# Patient Record
Sex: FEMALE | Race: WHITE | Employment: PART TIME | URBAN - METROPOLITAN AREA
[De-identification: names, ages, dates, MRNs, and addresses within clinical notes are randomized per-mention and may not be internally consistent; named-entity substitution may affect disease eponyms.]

---

## 2017-04-09 RX ORDER — LEVOTHYROXINE SODIUM 88 UG/1
TABLET ORAL
Qty: 90 TABLET | Refills: 0 | Status: SHIPPED | OUTPATIENT
Start: 2017-04-09 | End: 2017-05-11

## 2017-04-27 RX ORDER — LEVOTHYROXINE SODIUM 88 UG/1
TABLET ORAL
Qty: 90 TABLET | Refills: 0 | OUTPATIENT
Start: 2017-04-27

## 2017-05-02 ENCOUNTER — TELEPHONE (OUTPATIENT)
Dept: GASTROENTEROLOGY | Facility: CLINIC | Age: 42
End: 2017-05-02

## 2017-05-02 DIAGNOSIS — R74.8 ABNORMAL LIVER ENZYMES: Primary | ICD-10-CM

## 2017-05-02 NOTE — TELEPHONE ENCOUNTER
Pt contacted, states you had ordered some labs over a year ago that she did not do, so . Please enter any labs you want done--pt is seeing Dr Negrete Ask next week so that way she can look at your orders and make sure no duplicates. Thanks.

## 2017-05-02 NOTE — TELEPHONE ENCOUNTER
I would advise repeat liver panel and TATYANA level. She had done the blood work that I had ordered last year.      Order placed

## 2017-05-11 ENCOUNTER — OFFICE VISIT (OUTPATIENT)
Dept: INTERNAL MEDICINE CLINIC | Facility: CLINIC | Age: 42
End: 2017-05-11

## 2017-05-11 VITALS
SYSTOLIC BLOOD PRESSURE: 116 MMHG | OXYGEN SATURATION: 96 % | WEIGHT: 240.81 LBS | BODY MASS INDEX: 38 KG/M2 | DIASTOLIC BLOOD PRESSURE: 80 MMHG | TEMPERATURE: 98 F | HEART RATE: 72 BPM

## 2017-05-11 DIAGNOSIS — E03.9 HYPOTHYROIDISM, UNSPECIFIED TYPE: Primary | ICD-10-CM

## 2017-05-11 DIAGNOSIS — Z12.31 ENCOUNTER FOR SCREENING MAMMOGRAM FOR BREAST CANCER: ICD-10-CM

## 2017-05-11 DIAGNOSIS — Z00.00 ROUTINE HEALTH MAINTENANCE: ICD-10-CM

## 2017-05-11 DIAGNOSIS — J45.20 MILD INTERMITTENT ASTHMA WITHOUT COMPLICATION: ICD-10-CM

## 2017-05-11 DIAGNOSIS — K76.0 FATTY LIVER DISEASE, NONALCOHOLIC: ICD-10-CM

## 2017-05-11 PROCEDURE — 99396 PREV VISIT EST AGE 40-64: CPT | Performed by: INTERNAL MEDICINE

## 2017-05-11 RX ORDER — LEVOTHYROXINE SODIUM 0.05 MG/1
50 TABLET ORAL DAILY
COMMUNITY
End: 2017-05-11 | Stop reason: DRUGHIGH

## 2017-05-11 RX ORDER — ERGOCALCIFEROL 1.25 MG/1
1 CAPSULE ORAL
COMMUNITY
End: 2017-05-11 | Stop reason: ALTCHOICE

## 2017-05-11 RX ORDER — MEDROXYPROGESTERONE ACETATE 2.5 MG/1
1 TABLET ORAL DAILY
COMMUNITY
End: 2017-05-11

## 2017-05-11 RX ORDER — LEVOTHYROXINE SODIUM 88 UG/1
TABLET ORAL
Qty: 90 TABLET | Refills: 3 | Status: SHIPPED | OUTPATIENT
Start: 2017-05-11 | End: 2018-07-14

## 2017-05-11 NOTE — PROGRESS NOTES
Doris Burgos is a 38year old female. Patient presents with:  Physical      HPI:   Doris Burgos is a 38year old female who presents for a complete physical exam.   Feels well.    has been losing weight thru Dr. Shine Duarte, so pt has now opted t Past Medical History   Diagnosis Date   • Hypothyroid    • Migraines    • Tinnitus    • High cholesterol    • Fatty liver 11/2010     liver bx   • Bronchitis      hospital 7/10   • Bronchospasm      severe; hospital 7/10          Past Surgical History auscultation  CARDIO: RRR, normal S1S2, no gallops or murmurs  GI: soft, NT, ND, NABS, no HSM  EXTREMITIES: no cyanosis, clubbing or edema, +2 DP pulses        ASSESSMENT AND PLAN:     1.  Routine health maintenance  Paps per Dr. Chio Hooker done 1/

## 2017-05-12 ENCOUNTER — LAB ENCOUNTER (OUTPATIENT)
Dept: LAB | Facility: HOSPITAL | Age: 42
End: 2017-05-12
Attending: INTERNAL MEDICINE
Payer: COMMERCIAL

## 2017-05-12 DIAGNOSIS — J45.20 MILD INTERMITTENT ASTHMA WITHOUT COMPLICATION: ICD-10-CM

## 2017-05-12 DIAGNOSIS — Z00.00 ROUTINE HEALTH MAINTENANCE: ICD-10-CM

## 2017-05-12 DIAGNOSIS — R74.8 ABNORMAL LIVER ENZYMES: ICD-10-CM

## 2017-05-12 DIAGNOSIS — E03.9 HYPOTHYROIDISM, UNSPECIFIED TYPE: ICD-10-CM

## 2017-05-12 DIAGNOSIS — K76.0 FATTY LIVER DISEASE, NONALCOHOLIC: ICD-10-CM

## 2017-05-12 PROCEDURE — 85025 COMPLETE CBC W/AUTO DIFF WBC: CPT

## 2017-05-12 PROCEDURE — 80048 BASIC METABOLIC PNL TOTAL CA: CPT

## 2017-05-12 PROCEDURE — 86039 ANTINUCLEAR ANTIBODIES (ANA): CPT

## 2017-05-12 PROCEDURE — 84443 ASSAY THYROID STIM HORMONE: CPT

## 2017-05-12 PROCEDURE — 86038 ANTINUCLEAR ANTIBODIES: CPT

## 2017-05-12 PROCEDURE — 80061 LIPID PANEL: CPT

## 2017-05-12 PROCEDURE — 36415 COLL VENOUS BLD VENIPUNCTURE: CPT

## 2017-05-12 PROCEDURE — 82306 VITAMIN D 25 HYDROXY: CPT

## 2017-05-12 PROCEDURE — 80076 HEPATIC FUNCTION PANEL: CPT

## 2017-05-19 NOTE — TELEPHONE ENCOUNTER
----- Message from Dmitriy Barbosa MD sent at 5/16/2017 10:04 PM CDT -----  LFT still elevated, TATYANA positive - I am still concerned about autoimmune liver disease and RN to have pt see me in office in follow, it has been over 1 year since I have seen her.

## 2017-05-19 NOTE — TELEPHONE ENCOUNTER
Spoke with pt and reviewed below results    She already has follow-up scheduled for 6/20 to discuss results.

## 2017-05-31 ENCOUNTER — TELEPHONE (OUTPATIENT)
Dept: INTERNAL MEDICINE CLINIC | Facility: CLINIC | Age: 42
End: 2017-05-31

## 2017-05-31 NOTE — TELEPHONE ENCOUNTER
Pt notified liver enzymes are still elevated  To f/u with DR Andrés Kc  Pt states has appt for mid June  Vit D level low  rec 4000units / day   Added to med list   TATYANA titer high rec rheumatologist to see pt   - states has seen someone in the past will call fo

## 2017-05-31 NOTE — TELEPHONE ENCOUNTER
Please call pt with labs --  Liver enzymes still slightly elevated -- follow up with Dr. Apple Narayanan.   Vit D level low (20) -- start Vit D 4000 units/day  TATYANA level is high with high titers -- indicative of her undifferentiated connective tissue disease -- recom

## 2017-06-03 ENCOUNTER — HOSPITAL ENCOUNTER (OUTPATIENT)
Dept: MAMMOGRAPHY | Facility: HOSPITAL | Age: 42
Discharge: HOME | End: 2017-06-03
Attending: INTERNAL MEDICINE
Payer: COMMERCIAL

## 2017-06-03 DIAGNOSIS — Z12.31 ENCOUNTER FOR SCREENING MAMMOGRAM FOR BREAST CANCER: ICD-10-CM

## 2017-06-03 PROCEDURE — 77067 SCR MAMMO BI INCL CAD: CPT | Performed by: INTERNAL MEDICINE

## 2017-06-20 ENCOUNTER — OFFICE VISIT (OUTPATIENT)
Dept: GASTROENTEROLOGY | Facility: CLINIC | Age: 42
End: 2017-06-20

## 2017-06-20 VITALS
HEART RATE: 57 BPM | HEIGHT: 67 IN | BODY MASS INDEX: 37.73 KG/M2 | WEIGHT: 240.38 LBS | DIASTOLIC BLOOD PRESSURE: 77 MMHG | SYSTOLIC BLOOD PRESSURE: 112 MMHG

## 2017-06-20 DIAGNOSIS — R74.8 ABNORMAL LIVER ENZYMES: Primary | ICD-10-CM

## 2017-06-20 PROCEDURE — 99213 OFFICE O/P EST LOW 20 MIN: CPT | Performed by: INTERNAL MEDICINE

## 2017-06-20 PROCEDURE — 99212 OFFICE O/P EST SF 10 MIN: CPT | Performed by: INTERNAL MEDICINE

## 2017-06-20 RX ORDER — CLOBETASOL PROPIONATE 0.5 MG/G
OINTMENT TOPICAL
Refills: 0 | COMMUNITY
Start: 2017-06-02 | End: 2018-09-11

## 2017-06-20 NOTE — PATIENT INSTRUCTIONS
Fatty liver vs. Autoimmune liver disease  - forward info to [facility]  - continue to work on weight loss- agree with you seeing Dr. Enrique Frank  - avoid alcohol x 3 months  - repeat lab test /liver panel in September

## 2017-06-23 NOTE — PROGRESS NOTES
Cecile Del Cid is a 38year old female. HPI:   Patient presents with: Follow - Up: No current complaints       The patient is a 67-year-old female with a history of fatty liver disease and ongoing abnormal liver function tests.   She was seen at [facility] visit):    Current Outpatient Prescriptions:  Clobetasol Propionate 0.05 % External Ointment  Disp:  Rfl: 0   Multiple Vitamins-Minerals (MULTIVITAMIN OR) Take 1 tablet by mouth daily.  Disp:  Rfl:    Albuterol Sulfate HFA (PROAIR HFA) 108 (90 BASE) MCG/ACT hepatitis has been discussed with the patient. I discussed sometimes difficult to differentiate these 2 conditions. I reiterated the fact the patient should stop all alcohol consumption.   Additionally the patient will continue to work on healthy lifestyl

## 2017-06-28 ENCOUNTER — APPOINTMENT (OUTPATIENT)
Dept: LAB | Facility: HOSPITAL | Age: 42
End: 2017-06-28
Attending: INTERNAL MEDICINE
Payer: COMMERCIAL

## 2017-06-28 ENCOUNTER — OFFICE VISIT (OUTPATIENT)
Dept: SURGERY | Facility: CLINIC | Age: 42
End: 2017-06-28

## 2017-06-28 VITALS
HEIGHT: 66 IN | OXYGEN SATURATION: 98 % | DIASTOLIC BLOOD PRESSURE: 74 MMHG | RESPIRATION RATE: 18 BRPM | BODY MASS INDEX: 37.95 KG/M2 | SYSTOLIC BLOOD PRESSURE: 118 MMHG | WEIGHT: 236.13 LBS

## 2017-06-28 DIAGNOSIS — E66.9 OBESITY (BMI 30-39.9): ICD-10-CM

## 2017-06-28 DIAGNOSIS — E55.9 VITAMIN D DEFICIENCY: ICD-10-CM

## 2017-06-28 DIAGNOSIS — F43.9 STRESS: ICD-10-CM

## 2017-06-28 DIAGNOSIS — K76.0 FATTY LIVER DISEASE, NONALCOHOLIC: ICD-10-CM

## 2017-06-28 DIAGNOSIS — K76.0 FATTY LIVER DISEASE, NONALCOHOLIC: Primary | ICD-10-CM

## 2017-06-28 PROCEDURE — 93010 ELECTROCARDIOGRAM REPORT: CPT | Performed by: INTERNAL MEDICINE

## 2017-06-28 PROCEDURE — 99204 OFFICE O/P NEW MOD 45 MIN: CPT | Performed by: INTERNAL MEDICINE

## 2017-06-28 PROCEDURE — 93005 ELECTROCARDIOGRAM TRACING: CPT

## 2017-06-28 NOTE — PROGRESS NOTES
[facility] Consultation Note       Date of Consult:  2017    Patient:  Kendra Spicer  :      3/25/1975  MRN:      [id number]    Referring Provider: Dr. Wyatt Gill       Chief Complaint:  Patient presents with:  Consult  Weight times daily as needed.  Disp: 360 mL Rfl: 6   predniSONE 20 MG Oral Tab Take 3 tablets daily for 2 days, then 2 tablets daily for 3 days, then 1 tablet daily for 3 days Disp: 15 tablet Rfl: 5   ipratropium-albuterol (DUONEB) 0.5-2.5 (3) MG/3ML Inhalation So cream, cake Meat, veggie, starch   +large portion sizes  2 glasses of wine at night    Soda Drinker?: No  If yes, how much?:  Diet soda    Number of restaurant or fast food meals/week:  2 meals/week    Nutritional Goals Reviewed and Discussed:     Limit ca Patient desires to pursue traditional weight loss at this time. Fatty liver: should improve with weight loss. Vit d def: should continue supplements    Stress: currently in between homes    Goals for next month:  1. Keep a food log.   2. Drink 48-64

## 2017-07-18 ENCOUNTER — OFFICE VISIT (OUTPATIENT)
Dept: INTERNAL MEDICINE CLINIC | Facility: CLINIC | Age: 42
End: 2017-07-18

## 2017-07-18 VITALS
BODY MASS INDEX: 37.93 KG/M2 | RESPIRATION RATE: 16 BRPM | WEIGHT: 236 LBS | HEIGHT: 66 IN | HEART RATE: 80 BPM | TEMPERATURE: 98 F | DIASTOLIC BLOOD PRESSURE: 72 MMHG | SYSTOLIC BLOOD PRESSURE: 108 MMHG | OXYGEN SATURATION: 98 %

## 2017-07-18 DIAGNOSIS — J02.9 PHARYNGITIS, UNSPECIFIED ETIOLOGY: Primary | ICD-10-CM

## 2017-07-18 PROCEDURE — 99212 OFFICE O/P EST SF 10 MIN: CPT | Performed by: INTERNAL MEDICINE

## 2017-07-18 PROCEDURE — 99213 OFFICE O/P EST LOW 20 MIN: CPT | Performed by: INTERNAL MEDICINE

## 2017-07-18 RX ORDER — ACETAMINOPHEN AND CODEINE PHOSPHATE 300; 30 MG/1; MG/1
1 TABLET ORAL EVERY 4 HOURS PRN
Qty: 20 TABLET | Refills: 0 | Status: SHIPPED | OUTPATIENT
Start: 2017-07-18 | End: 2018-09-11

## 2017-07-18 NOTE — PROGRESS NOTES
Iggy Leyva is a 38year old female. Patient presents with:  Sore Throat: Last Monday patient began experiencing productive cough with yellow phlegm. Cough has started to resolve, however the pt continues to experience sore throat.  Described painful s Past Medical History:   Diagnosis Date   • Bronchitis     hospital 7/10   • Bronchospasm     severe; hospital 7/10   • Fatty liver 11/2010    liver bx   • High cholesterol    • Hypothyroid    • Migraines    • Tinnitus       Social History:  Smoking statu

## 2018-07-15 RX ORDER — LEVOTHYROXINE SODIUM 88 UG/1
TABLET ORAL
Qty: 90 TABLET | Refills: 0 | Status: SHIPPED | OUTPATIENT
Start: 2018-07-15 | End: 2018-09-11

## 2018-09-11 ENCOUNTER — OFFICE VISIT (OUTPATIENT)
Dept: INTERNAL MEDICINE CLINIC | Facility: CLINIC | Age: 43
End: 2018-09-11
Payer: COMMERCIAL

## 2018-09-11 ENCOUNTER — LAB ENCOUNTER (OUTPATIENT)
Dept: LAB | Age: 43
End: 2018-09-11
Attending: INTERNAL MEDICINE
Payer: COMMERCIAL

## 2018-09-11 VITALS
BODY MASS INDEX: 37.8 KG/M2 | DIASTOLIC BLOOD PRESSURE: 80 MMHG | SYSTOLIC BLOOD PRESSURE: 102 MMHG | WEIGHT: 235.19 LBS | HEIGHT: 66 IN | OXYGEN SATURATION: 95 % | TEMPERATURE: 98 F | HEART RATE: 79 BPM

## 2018-09-11 DIAGNOSIS — R53.83 OTHER FATIGUE: ICD-10-CM

## 2018-09-11 DIAGNOSIS — K76.0 FATTY LIVER DISEASE, NONALCOHOLIC: ICD-10-CM

## 2018-09-11 DIAGNOSIS — Z00.00 ROUTINE HEALTH MAINTENANCE: ICD-10-CM

## 2018-09-11 DIAGNOSIS — Z12.31 ENCOUNTER FOR SCREENING MAMMOGRAM FOR BREAST CANCER: Primary | ICD-10-CM

## 2018-09-11 DIAGNOSIS — R77.1 ELEVATED SERUM GLOBULIN LEVEL: ICD-10-CM

## 2018-09-11 DIAGNOSIS — E55.9 VITAMIN D DEFICIENCY: ICD-10-CM

## 2018-09-11 DIAGNOSIS — J45.20 MILD INTERMITTENT ASTHMA WITHOUT COMPLICATION: ICD-10-CM

## 2018-09-11 DIAGNOSIS — E03.9 HYPOTHYROIDISM, UNSPECIFIED TYPE: ICD-10-CM

## 2018-09-11 PROBLEM — F43.9 STRESS: Status: RESOLVED | Noted: 2017-06-28 | Resolved: 2018-09-11

## 2018-09-11 LAB
ALBUMIN SERPL BCP-MCNC: 4.1 G/DL (ref 3.5–4.8)
ALBUMIN/GLOB SERPL: 1 {RATIO} (ref 1–2)
ALP SERPL-CCNC: 78 U/L (ref 32–100)
ALT SERPL-CCNC: 101 U/L (ref 14–54)
ANION GAP SERPL CALC-SCNC: 10 MMOL/L (ref 0–18)
AST SERPL-CCNC: 83 U/L (ref 15–41)
BASOPHILS # BLD: 0.1 K/UL (ref 0–0.2)
BASOPHILS NFR BLD: 1 %
BILIRUB SERPL-MCNC: 1 MG/DL (ref 0.3–1.2)
BUN SERPL-MCNC: 15 MG/DL (ref 8–20)
BUN/CREAT SERPL: 16 (ref 10–20)
CALCIUM SERPL-MCNC: 9.2 MG/DL (ref 8.5–10.5)
CHLORIDE SERPL-SCNC: 97 MMOL/L (ref 95–110)
CHOLEST SERPL-MCNC: 206 MG/DL (ref 110–200)
CO2 SERPL-SCNC: 28 MMOL/L (ref 22–32)
CREAT SERPL-MCNC: 0.94 MG/DL (ref 0.5–1.5)
EOSINOPHIL # BLD: 0.1 K/UL (ref 0–0.7)
EOSINOPHIL NFR BLD: 2 %
ERYTHROCYTE [DISTWIDTH] IN BLOOD BY AUTOMATED COUNT: 13.3 % (ref 11–15)
GLOBULIN PLAS-MCNC: 4.2 G/DL (ref 2.5–3.7)
GLUCOSE SERPL-MCNC: 82 MG/DL (ref 70–99)
HCT VFR BLD AUTO: 45.5 % (ref 35–48)
HDLC SERPL-MCNC: 50 MG/DL
HGB BLD-MCNC: 15.5 G/DL (ref 12–16)
LDLC SERPL CALC-MCNC: 134 MG/DL (ref 0–99)
LYMPHOCYTES # BLD: 2.1 K/UL (ref 1–4)
LYMPHOCYTES NFR BLD: 31 %
MCH RBC QN AUTO: 32.1 PG (ref 27–32)
MCHC RBC AUTO-ENTMCNC: 34.1 G/DL (ref 32–37)
MCV RBC AUTO: 94.1 FL (ref 80–100)
MONOCYTES # BLD: 0.6 K/UL (ref 0–1)
MONOCYTES NFR BLD: 8 %
NEUTROPHILS # BLD AUTO: 4 K/UL (ref 1.8–7.7)
NEUTROPHILS NFR BLD: 58 %
NONHDLC SERPL-MCNC: 156 MG/DL
OSMOLALITY UR CALC.SUM OF ELEC: 280 MOSM/KG (ref 275–295)
PATIENT FASTING: NO
PLATELET # BLD AUTO: 229 K/UL (ref 140–400)
PMV BLD AUTO: 10.8 FL (ref 7.4–10.3)
POTASSIUM SERPL-SCNC: 3.6 MMOL/L (ref 3.3–5.1)
PROT SERPL-MCNC: 8.3 G/DL (ref 5.9–8.4)
RBC # BLD AUTO: 4.83 M/UL (ref 3.7–5.4)
SODIUM SERPL-SCNC: 135 MMOL/L (ref 136–144)
TRIGL SERPL-MCNC: 111 MG/DL (ref 1–149)
TSH SERPL-ACNC: 2.26 UIU/ML (ref 0.45–5.33)
WBC # BLD AUTO: 6.9 K/UL (ref 4–11)

## 2018-09-11 PROCEDURE — 99396 PREV VISIT EST AGE 40-64: CPT | Performed by: INTERNAL MEDICINE

## 2018-09-11 PROCEDURE — 80061 LIPID PANEL: CPT

## 2018-09-11 PROCEDURE — 84443 ASSAY THYROID STIM HORMONE: CPT | Performed by: INTERNAL MEDICINE

## 2018-09-11 PROCEDURE — 85025 COMPLETE CBC W/AUTO DIFF WBC: CPT

## 2018-09-11 PROCEDURE — 86334 IMMUNOFIX E-PHORESIS SERUM: CPT | Performed by: INTERNAL MEDICINE

## 2018-09-11 PROCEDURE — 36415 COLL VENOUS BLD VENIPUNCTURE: CPT | Performed by: INTERNAL MEDICINE

## 2018-09-11 PROCEDURE — 80053 COMPREHEN METABOLIC PANEL: CPT

## 2018-09-11 PROCEDURE — 84165 PROTEIN E-PHORESIS SERUM: CPT | Performed by: INTERNAL MEDICINE

## 2018-09-11 PROCEDURE — 82306 VITAMIN D 25 HYDROXY: CPT

## 2018-09-11 RX ORDER — LEVOTHYROXINE SODIUM 88 UG/1
88 TABLET ORAL
Qty: 90 TABLET | Refills: 3 | Status: SHIPPED | OUTPATIENT
Start: 2018-09-11 | End: 2019-10-08

## 2018-09-11 RX ORDER — PREDNISONE 20 MG/1
TABLET ORAL
Qty: 15 TABLET | Refills: 5 | Status: SHIPPED | OUTPATIENT
Start: 2018-09-11 | End: 2019-09-10 | Stop reason: ALTCHOICE

## 2018-09-11 NOTE — PROGRESS NOTES
Kian Andrade is a 37year old female. Patient presents with:  Physical      HPI:   Kian Andrade is a 37year old female who presents for a complete physical exam.   Feels well. Lots of stress this past year, but things settling down now.   Dakota Lu Mother    • Cancer Maternal Grandmother         brain   • Hypertension Paternal Grandmother    • Cancer Paternal Grandmother         Breast- Age Related   • Breast Cancer Paternal Grandmother 80   • Heart Attack Maternal Grandfather    • Heart Disease Mate Levothyroxine 88mcg/day.      3. Asthma  Stable; occurs mainly in setting of URI.  Has not required any inhalers since 2/2018 when she was sick. 4. Fatty liver disease, nonalcoholic  History of fatty liver diagnosed via liver biopsy in November 2010.  Karena Lincoln

## 2018-09-12 ENCOUNTER — TELEPHONE (OUTPATIENT)
Dept: INTERNAL MEDICINE CLINIC | Facility: CLINIC | Age: 43
End: 2018-09-12

## 2018-09-12 DIAGNOSIS — R77.1 ELEVATED SERUM GLOBULIN LEVEL: Primary | ICD-10-CM

## 2018-09-12 LAB — 25(OH)D3 SERPL-MCNC: 30.9 NG/ML

## 2018-09-12 NOTE — TELEPHONE ENCOUNTER
Called and relayed MDs message on pts vm (hipaa verified). Instructed to call back with any questions.

## 2018-09-12 NOTE — TELEPHONE ENCOUNTER
Please call with lab results. LDL still slightly elevated but improved. Liver enzymes are higher than last year. Please see Dr. Wendy Moreau as discussed.   Her globulin (antibody) level is slightly high -- I have added a couple of additional labs to her blood

## 2018-09-14 LAB
ALBUMIN SERPL ELPH-MCNC: 4.32 G/DL (ref 3.75–5.21)
ALBUMIN/GLOB SERPL: 1.17 {RATIO} (ref 1–2)
ALPHA1 GLOB SERPL ELPH-MCNC: 0.28 G/DL (ref 0.19–0.46)
ALPHA2 GLOB SERPL ELPH-MCNC: 0.72 G/DL (ref 0.48–1.05)
B-GLOBULIN SERPL ELPH-MCNC: 0.94 G/DL (ref 0.68–1.23)
GAMMA GLOB SERPL ELPH-MCNC: 1.74 G/DL (ref 0.62–1.7)
TOTAL PROTEIN (SPECIAL TESTING): 8 G/DL (ref 6.5–9.1)

## 2018-09-29 ENCOUNTER — HOSPITAL ENCOUNTER (OUTPATIENT)
Dept: MAMMOGRAPHY | Facility: HOSPITAL | Age: 43
Discharge: HOME | End: 2018-09-29
Attending: INTERNAL MEDICINE
Payer: COMMERCIAL

## 2018-09-29 DIAGNOSIS — Z12.31 ENCOUNTER FOR SCREENING MAMMOGRAM FOR BREAST CANCER: ICD-10-CM

## 2018-09-29 PROCEDURE — 77067 SCR MAMMO BI INCL CAD: CPT | Performed by: INTERNAL MEDICINE

## 2018-10-24 ENCOUNTER — OFFICE VISIT (OUTPATIENT)
Dept: GASTROENTEROLOGY | Facility: CLINIC | Age: 43
End: 2018-10-24
Payer: COMMERCIAL

## 2018-10-24 VITALS
WEIGHT: 239 LBS | HEIGHT: 66 IN | BODY MASS INDEX: 38.41 KG/M2 | SYSTOLIC BLOOD PRESSURE: 121 MMHG | HEART RATE: 69 BPM | DIASTOLIC BLOOD PRESSURE: 78 MMHG

## 2018-10-24 DIAGNOSIS — R74.8 ABNORMAL LIVER ENZYMES: Primary | ICD-10-CM

## 2018-10-24 PROCEDURE — 99212 OFFICE O/P EST SF 10 MIN: CPT | Performed by: INTERNAL MEDICINE

## 2018-10-24 PROCEDURE — 99213 OFFICE O/P EST LOW 20 MIN: CPT | Performed by: INTERNAL MEDICINE

## 2018-10-24 NOTE — PATIENT INSTRUCTIONS
Fatty liver / abnormal liver function  - work on healthy eating and exercise  - limit alcohol  - repeat liver panel in 3 months  - agree with input from rheumatologist, we could have you seen by academic hepatologist again - pending lab work

## 2018-10-24 NOTE — PROGRESS NOTES
Omar Garcia is a 37year old female. HPI:   Patient presents with: Follow - Up    The patient is a 68-year-old female with a history of abnormal liver function tests/fatty liver.   She follows up in the office today and has really no interval symp tobacco: Never Used    Alcohol use:  Yes      Alcohol/week: 2.0 oz      Types: 4 Shots of liquor per week      Comment: Socially    Drug use: No       Medications (Active prior to today's visit):    Current Outpatient Medications:  predniSONE 20 MG Oral Tab to follow back up. I again reiterated our strategy here, she will work on weight loss, cutting out her alcohol use and exercise and healthy eating. I would advise repeating liver panel in 3 months time.   Considerations for follow back up in an academic

## 2019-02-25 ENCOUNTER — TELEPHONE (OUTPATIENT)
Dept: GASTROENTEROLOGY | Facility: CLINIC | Age: 44
End: 2019-02-25

## 2019-09-10 ENCOUNTER — TELEPHONE (OUTPATIENT)
Dept: INTERNAL MEDICINE CLINIC | Facility: CLINIC | Age: 44
End: 2019-09-10

## 2019-09-10 DIAGNOSIS — Z00.00 ROUTINE HEALTH MAINTENANCE: ICD-10-CM

## 2019-09-10 DIAGNOSIS — K76.0 FATTY LIVER DISEASE, NONALCOHOLIC: ICD-10-CM

## 2019-09-10 DIAGNOSIS — E55.9 VITAMIN D DEFICIENCY: ICD-10-CM

## 2019-09-10 DIAGNOSIS — R53.83 OTHER FATIGUE: ICD-10-CM

## 2019-09-10 DIAGNOSIS — Z12.31 ENCOUNTER FOR SCREENING MAMMOGRAM FOR BREAST CANCER: Primary | ICD-10-CM

## 2019-09-10 DIAGNOSIS — J45.20 MILD INTERMITTENT ASTHMA WITHOUT COMPLICATION: ICD-10-CM

## 2019-09-10 DIAGNOSIS — E03.9 HYPOTHYROIDISM, UNSPECIFIED TYPE: ICD-10-CM

## 2019-09-10 RX ORDER — PREDNISONE 20 MG/1
TABLET ORAL
Qty: 15 TABLET | Refills: 5 | COMMUNITY
Start: 2019-09-10 | End: 2020-08-18

## 2019-09-10 NOTE — TELEPHONE ENCOUNTER
Mammogram from 09/2018 shows breasts category B. Order entered for regular mammogram per protocol. To Dr. Beverley Farley to advise on labs, pending per protocol. Thanks!

## 2019-09-25 ENCOUNTER — OFFICE VISIT (OUTPATIENT)
Dept: RHEUMATOLOGY | Facility: CLINIC | Age: 44
End: 2019-09-25
Payer: COMMERCIAL

## 2019-09-25 VITALS
HEIGHT: 66 IN | HEART RATE: 62 BPM | SYSTOLIC BLOOD PRESSURE: 110 MMHG | BODY MASS INDEX: 36.32 KG/M2 | DIASTOLIC BLOOD PRESSURE: 72 MMHG | WEIGHT: 226 LBS | RESPIRATION RATE: 16 BRPM

## 2019-09-25 DIAGNOSIS — R76.8 POSITIVE ANA (ANTINUCLEAR ANTIBODY): Primary | ICD-10-CM

## 2019-09-25 DIAGNOSIS — K76.0 FATTY LIVER: ICD-10-CM

## 2019-09-25 PROCEDURE — 99244 OFF/OP CNSLTJ NEW/EST MOD 40: CPT | Performed by: INTERNAL MEDICINE

## 2019-09-25 NOTE — PATIENT INSTRUCTIONS
You were seen today for +TATYANA  Your blood work for lupus is negative  Things to look out of rashes, ulcers in mouth, joint swelling  For now get blood work done

## 2019-09-25 NOTE — PROGRESS NOTES
Dilip Celaya is a 53year old female who presents for Patient presents with:  Consult  .    HPI:   CC: +TATYANA, UCTD  Consult: referred by PCP Dr. Vlad Correa  Previously rheumatologist Dr. Mario Shane    This is a 54 yo F with hx of NAFLD (follows with tablets daily for 2 days, then 2 tablets daily for 3 days, then 1 tablet daily for 3 days Disp: 15 tablet Rfl: 5      Past Medical History:   Diagnosis Date   • Bronchitis     hospital 7/10   • Bronchospasm     severe; hospital 7/10   • Fatty liver 11/2010 OCP,   Neuro: No numbness or tingling, no headache, no hx of seizures,   Psych: no hx of anxiety or depression  ENDO: no hx of thyroid disease, no hx of DM  Joint/Muscluskeltal: see HPI,   All other ROS are negative.      EXAM:   /72 (BP Location: [facility] 3/7/2014          11:49 AM 11:49 AM   ANTICARDIOLIPIN AB,IGG,QN      0 - 14 GPL U/mL  <9   ANTICARDIOLIPIN AB,IGM,QN      0 - 12 MPL U/mL  23 (H)   ANTICARDIOLIPIN AB, IGA, QN      0 - 11 APL U/mL  <9   Beta-2 Glycoprotein I, IgG Antibody      0 - 20 GPI I Neutrophils Absolute      1.8 - 7.7 K/UL 4.0    Lymphocytes Absolute      1.0 - 4.0 K/UL 2.1    Monocytes Absolute      0.0 - 1.0 K/UL 0.6    Eosinophils Absolute      0.0 - 0.7 K/UL 0.1    Basophils Absolute      0.0 - 0.2 K/UL 0.1    Bilirubin, Direct

## 2019-10-01 ENCOUNTER — HOSPITAL ENCOUNTER (OUTPATIENT)
Dept: MAMMOGRAPHY | Facility: HOSPITAL | Age: 44
Discharge: HOME | End: 2019-10-01
Attending: INTERNAL MEDICINE
Payer: COMMERCIAL

## 2019-10-01 DIAGNOSIS — Z12.31 ENCOUNTER FOR SCREENING MAMMOGRAM FOR BREAST CANCER: ICD-10-CM

## 2019-10-01 PROCEDURE — 77063 BREAST TOMOSYNTHESIS BI: CPT | Performed by: INTERNAL MEDICINE

## 2019-10-01 PROCEDURE — 77067 SCR MAMMO BI INCL CAD: CPT | Performed by: INTERNAL MEDICINE

## 2019-10-04 ENCOUNTER — LAB ENCOUNTER (OUTPATIENT)
Dept: LAB | Facility: HOSPITAL | Age: 44
End: 2019-10-04
Attending: INTERNAL MEDICINE
Payer: COMMERCIAL

## 2019-10-04 DIAGNOSIS — K76.0 FATTY LIVER DISEASE, NONALCOHOLIC: ICD-10-CM

## 2019-10-04 DIAGNOSIS — R74.8 ABNORMAL LIVER ENZYMES: ICD-10-CM

## 2019-10-04 DIAGNOSIS — E03.9 HYPOTHYROIDISM, UNSPECIFIED TYPE: ICD-10-CM

## 2019-10-04 DIAGNOSIS — R53.83 OTHER FATIGUE: ICD-10-CM

## 2019-10-04 PROCEDURE — 82248 BILIRUBIN DIRECT: CPT

## 2019-10-04 PROCEDURE — 80053 COMPREHEN METABOLIC PANEL: CPT

## 2019-10-04 PROCEDURE — 85025 COMPLETE CBC W/AUTO DIFF WBC: CPT

## 2019-10-04 PROCEDURE — 36415 COLL VENOUS BLD VENIPUNCTURE: CPT

## 2019-10-04 PROCEDURE — 80061 LIPID PANEL: CPT

## 2019-10-04 PROCEDURE — 84443 ASSAY THYROID STIM HORMONE: CPT

## 2019-10-08 ENCOUNTER — TELEPHONE (OUTPATIENT)
Dept: GASTROENTEROLOGY | Facility: CLINIC | Age: 44
End: 2019-10-08

## 2019-10-08 ENCOUNTER — OFFICE VISIT (OUTPATIENT)
Dept: INTERNAL MEDICINE CLINIC | Facility: CLINIC | Age: 44
End: 2019-10-08
Payer: COMMERCIAL

## 2019-10-08 VITALS
WEIGHT: 225 LBS | TEMPERATURE: 98 F | RESPIRATION RATE: 16 BRPM | HEART RATE: 84 BPM | BODY MASS INDEX: 36.16 KG/M2 | SYSTOLIC BLOOD PRESSURE: 108 MMHG | DIASTOLIC BLOOD PRESSURE: 70 MMHG | HEIGHT: 66 IN | OXYGEN SATURATION: 98 %

## 2019-10-08 DIAGNOSIS — Z00.00 ROUTINE HEALTH MAINTENANCE: ICD-10-CM

## 2019-10-08 DIAGNOSIS — R74.8 ABNORMAL LIVER ENZYMES: Primary | ICD-10-CM

## 2019-10-08 DIAGNOSIS — E03.9 HYPOTHYROIDISM, UNSPECIFIED TYPE: ICD-10-CM

## 2019-10-08 DIAGNOSIS — K76.0 FATTY LIVER DISEASE, NONALCOHOLIC: ICD-10-CM

## 2019-10-08 DIAGNOSIS — J45.20 MILD INTERMITTENT ASTHMA WITHOUT COMPLICATION: Primary | ICD-10-CM

## 2019-10-08 DIAGNOSIS — K76.0 NAFLD (NONALCOHOLIC FATTY LIVER DISEASE): ICD-10-CM

## 2019-10-08 PROCEDURE — 90686 IIV4 VACC NO PRSV 0.5 ML IM: CPT | Performed by: INTERNAL MEDICINE

## 2019-10-08 PROCEDURE — 99396 PREV VISIT EST AGE 40-64: CPT | Performed by: INTERNAL MEDICINE

## 2019-10-08 PROCEDURE — 90472 IMMUNIZATION ADMIN EACH ADD: CPT | Performed by: INTERNAL MEDICINE

## 2019-10-08 PROCEDURE — 90715 TDAP VACCINE 7 YRS/> IM: CPT | Performed by: INTERNAL MEDICINE

## 2019-10-08 PROCEDURE — 90471 IMMUNIZATION ADMIN: CPT | Performed by: INTERNAL MEDICINE

## 2019-10-08 RX ORDER — LEVOTHYROXINE SODIUM 88 UG/1
88 TABLET ORAL
Qty: 90 TABLET | Refills: 3 | Status: SHIPPED | OUTPATIENT
Start: 2019-10-08 | End: 2020-10-01

## 2019-10-08 NOTE — PROGRESS NOTES
Mello Peguero is a 53year old female. Patient presents with:  Physical: Annual Px      HPI:   Mello Peguero is a 53year old female who presents for a complete physical exam.   Feels well. States she has done [employer] all of her life. Father    • Obesity Father    • Pancreatic Cancer Father 76   • Diabetes Mother    • Hypertension Mother    • Obesity Mother    • Cancer Maternal Grandmother         brain   • Hypertension Paternal Grandmother    • Cancer Paternal Grandmother         Phillip West that she had lichen ?sclerosis; was given a cream which she did not like. Rec follow up with gyne Dr. Wilfredo Jett Mammo normal 10/1/19. Lipids, FBS normal.  Tdap today 10/8/19. Cont exercise. Had flu vaccine today.     Oligomenorrhea  Has had irregular menses population with a +TATYANA with no clinical significance. Was told to follow up in 6 months. RTC 1 yr.       Mirna Hall, 10/08/19, 11:14 AM

## 2019-10-08 NOTE — TELEPHONE ENCOUNTER
Pt is scheduled on 11/21/2019 215 pm and would like to know if  can put orders in the system for labs.  States her PCP mentioned a liver biopsy and that's the reason for follow up visit and would like to know if  agrees for on going issue please call th

## 2019-10-08 NOTE — TELEPHONE ENCOUNTER
Dr. Rossi Childs,     Pt saw PCP and talked about following up with you regarding elevated LFTs and talked about liver biopsy.   Patient made an appointment for 11/21/19 and wants to know if there are other labs you want her to do prior to this appt that PCP has n

## 2019-10-08 NOTE — TELEPHONE ENCOUNTER
Her liver tests are elevated but in same range as prior lab levels. I would suggest repeat liver ultrasound scan /elastography. Have her get this exam which will check for scar tissue on liver and will have her follow up.   Has she been back to [facility]

## 2019-10-09 NOTE — TELEPHONE ENCOUNTER
Patient scheduled for U/S 10/10/19 at [facility]    Future Appointments   Date Time Provider Andrea Cruz   10/10/2019  7:30 AM [address] [facility]   11/21/2019  2:15 PM Cesar Avelar MD [facility] [place] Wesdevin ECHOLS

## 2019-10-09 NOTE — TELEPHONE ENCOUNTER
Cleveland Perry, she has not been back to [facility] since her initial visit. She has not exactly been super compliant. I tried to impress upon her the potential seriousness of fatty liver disease. Looks like you already ordered the u/s. Thank you!

## 2019-10-09 NOTE — TELEPHONE ENCOUNTER
Dr. Darrel Arcos    Per my conversation with her yesterday she has not followed up with Dr. Laurie Watkins at [facility] because he is out of network with her insurance and last time she went there she received a huge bill.  Does not want to go back there at this time

## 2019-10-10 ENCOUNTER — HOSPITAL ENCOUNTER (OUTPATIENT)
Dept: ULTRASOUND IMAGING | Facility: HOSPITAL | Age: 44
Discharge: HOME | End: 2019-10-10
Attending: INTERNAL MEDICINE
Payer: COMMERCIAL

## 2019-10-10 DIAGNOSIS — R74.8 ABNORMAL LIVER ENZYMES: ICD-10-CM

## 2019-10-10 PROCEDURE — 76981 USE PARENCHYMA: CPT | Performed by: INTERNAL MEDICINE

## 2019-10-10 PROCEDURE — 76705 ECHO EXAM OF ABDOMEN: CPT | Performed by: INTERNAL MEDICINE

## 2019-10-10 RX ORDER — LEVOTHYROXINE SODIUM 88 UG/1
TABLET ORAL
Qty: 90 TABLET | Refills: 0 | OUTPATIENT
Start: 2019-10-10

## 2019-10-10 NOTE — TELEPHONE ENCOUNTER
Current refill request refused due to refill is either a duplicate request or has active refills at the pharmacy. Check previous templates.     Requested Prescriptions     Refused Prescriptions Disp Refills   • LEVOTHYROXINE SODIUM 88 MCG Oral Tab [Pharmac

## 2019-11-21 ENCOUNTER — OFFICE VISIT (OUTPATIENT)
Dept: GASTROENTEROLOGY | Facility: CLINIC | Age: 44
End: 2019-11-21
Payer: COMMERCIAL

## 2019-11-21 VITALS
HEIGHT: 66 IN | WEIGHT: 226 LBS | SYSTOLIC BLOOD PRESSURE: 113 MMHG | BODY MASS INDEX: 36.32 KG/M2 | HEART RATE: 74 BPM | DIASTOLIC BLOOD PRESSURE: 78 MMHG

## 2019-11-21 DIAGNOSIS — K76.0 NAFLD (NONALCOHOLIC FATTY LIVER DISEASE): Primary | ICD-10-CM

## 2019-11-21 DIAGNOSIS — R74.8 ABNORMAL LIVER ENZYMES: ICD-10-CM

## 2019-11-21 PROCEDURE — 99213 OFFICE O/P EST LOW 20 MIN: CPT | Performed by: INTERNAL MEDICINE

## 2019-11-21 NOTE — PATIENT INSTRUCTIONS
Abnormal liver tests  - likely fatty liver condition - recent liver utrasound negative for significant scar build up  - limit alcohol  - work on weight loss and exercise  - follow up with lab work - liver panel annually  - see me in the office every year

## 2019-11-21 NOTE — PROGRESS NOTES
Yareli Plascencia is a 53year old female. HPI:   Patient presents with: Follow - Up: Discuss test results    Jackelin follows up in the office today. She is a 57-year-old female with a history of fatty liver and positive TATYANA.   It is not been thought use: No       Medications (Active prior to today's visit):  Current Outpatient Medications   Medication Sig Dispense Refill   • Levothyroxine Sodium 88 MCG Oral Tab Take 1 tablet (88 mcg total) by mouth before breakfast. 90 tablet 3   • Multiple Vitamins-M condition - recent liver utrasound negative for significant scar build up  - limit alcohol  - work on weight loss and exercise  - follow up with lab work - liver panel annually  - see me in the office every year  - repeat liver ultrasound/elastography in 2

## 2019-12-12 ENCOUNTER — OFFICE VISIT (OUTPATIENT)
Dept: OBGYN CLINIC | Facility: CLINIC | Age: 44
End: 2019-12-12
Payer: COMMERCIAL

## 2019-12-12 VITALS
BODY MASS INDEX: 36.76 KG/M2 | DIASTOLIC BLOOD PRESSURE: 80 MMHG | SYSTOLIC BLOOD PRESSURE: 129 MMHG | HEIGHT: 65.75 IN | WEIGHT: 226 LBS | HEART RATE: 78 BPM

## 2019-12-12 DIAGNOSIS — R68.82 DECREASED LIBIDO: ICD-10-CM

## 2019-12-12 DIAGNOSIS — N76.3 CHRONIC VULVITIS: ICD-10-CM

## 2019-12-12 DIAGNOSIS — N90.89 VULVAR LESION: ICD-10-CM

## 2019-12-12 DIAGNOSIS — N91.1 AMENORRHEA, SECONDARY: ICD-10-CM

## 2019-12-12 DIAGNOSIS — Z01.419 ENCOUNTER FOR GYNECOLOGICAL EXAMINATION WITHOUT ABNORMAL FINDING: Primary | ICD-10-CM

## 2019-12-12 PROCEDURE — 99214 OFFICE O/P EST MOD 30 MIN: CPT | Performed by: OBSTETRICS & GYNECOLOGY

## 2019-12-12 PROCEDURE — 99386 PREV VISIT NEW AGE 40-64: CPT | Performed by: OBSTETRICS & GYNECOLOGY

## 2019-12-12 NOTE — PROGRESS NOTES
Win Washington is a 53year old female  No LMP recorded (approximate). Patient is perimenopausal.  Patient presents with:  Gyn Exam: Annual exam   She is a former patient of Dr Lydia Olivarez. she was referred by Dr Jorge L Monroe her pcp.   Beverley Farley Medical History:   Diagnosis Date   • Bronchitis     hospital 7/10   • Bronchospasm     severe; hospital 7/10   • Fatty liver 11/2010    liver bx   • High cholesterol    • Hypothyroid    • Migraines    • Tinnitus      Past Surgical History:   Procedure Lat 2 cups of coffee        Occupational Exposure: Not Asked        Hobby Hazards: Not Asked        Sleep Concern: Not Asked        Stress Concern: Not Asked        Weight Concern: Not Asked        Special Diet: Not Asked        Back Care: Not Asked breastfeeding.     Review of Systems:  Constitutional:  Denies fatigue, night sweats, hot flashes  Eyes:  denies blurred or double vision  Cardiovascular:  denies chest pain or palpitations  Respiratory:  denies shortness of breath  Gastrointestinal:  denie normal  Anus: no hemorroids     Assessment & Plan:    Encounter for gynecological examination without abnormal finding  (primary encounter diagnosis)  Amenorrhea, secondary  Chronic vulvitis  Vulvar lesion  Decreased libido  ASCCP guidelines discussed,[place]ada

## 2019-12-13 ENCOUNTER — LAB ENCOUNTER (OUTPATIENT)
Dept: LAB | Facility: HOSPITAL | Age: 44
End: 2019-12-13
Attending: OBSTETRICS & GYNECOLOGY
Payer: COMMERCIAL

## 2019-12-13 DIAGNOSIS — N91.1 AMENORRHEA, SECONDARY: ICD-10-CM

## 2019-12-13 PROCEDURE — 83001 ASSAY OF GONADOTROPIN (FSH): CPT

## 2019-12-13 PROCEDURE — 36415 COLL VENOUS BLD VENIPUNCTURE: CPT

## 2019-12-13 PROCEDURE — 82670 ASSAY OF TOTAL ESTRADIOL: CPT

## 2019-12-13 PROCEDURE — 83002 ASSAY OF GONADOTROPIN (LH): CPT

## 2019-12-16 ENCOUNTER — TELEPHONE (OUTPATIENT)
Dept: OBGYN CLINIC | Facility: CLINIC | Age: 44
End: 2019-12-16

## 2019-12-20 ENCOUNTER — OFFICE VISIT (OUTPATIENT)
Dept: OBGYN CLINIC | Facility: CLINIC | Age: 44
End: 2019-12-20
Payer: COMMERCIAL

## 2019-12-20 VITALS
BODY MASS INDEX: 37 KG/M2 | SYSTOLIC BLOOD PRESSURE: 102 MMHG | WEIGHT: 226.19 LBS | HEART RATE: 63 BPM | DIASTOLIC BLOOD PRESSURE: 67 MMHG

## 2019-12-20 DIAGNOSIS — N76.3 SUBACUTE VULVITIS: Primary | ICD-10-CM

## 2019-12-20 PROCEDURE — 56605 BIOPSY OF VULVA/PERINEUM: CPT | Performed by: OBSTETRICS & GYNECOLOGY

## 2019-12-20 PROCEDURE — 56606 BIOPSY OF VULVA/PERINEUM: CPT | Performed by: OBSTETRICS & GYNECOLOGY

## 2019-12-20 NOTE — PROCEDURES
Vulvar Biopsy       Birth control method(s) used:  postmenopausal    Consent signed. Procedure discussed with patient in detail including indication, risk, benefits, alternatives and complications.     Lesion Description: thick whitish skin in perineal and

## 2019-12-27 ENCOUNTER — TELEPHONE (OUTPATIENT)
Dept: OBGYN CLINIC | Facility: CLINIC | Age: 44
End: 2019-12-27

## 2019-12-27 RX ORDER — CLOBETASOL PROPIONATE 0.5 MG/G
1 OINTMENT TOPICAL 2 TIMES DAILY
Qty: 1 TUBE | Refills: 2 | Status: SHIPPED | OUTPATIENT
Start: 2019-12-27

## 2020-08-04 ENCOUNTER — TELEPHONE (OUTPATIENT)
Dept: OBGYN CLINIC | Facility: CLINIC | Age: 45
End: 2020-08-04

## 2020-08-04 NOTE — TELEPHONE ENCOUNTER
C/O INTERMITTENT BOUTS OF SPOTTING IN THE PAST 2-3 MONTHS. NO SPOTTING OR PERIODS FOR 2 YEARS PRIOR TO THAT. ADVISED TO BE SEEN FOR EMBX. APPT MADE AND ADVISED TO TAKE IBUPROFEN 600MG ABOUT 30-60 MINUTES PRIOR TO THE APPT.   ENCOURAGED TO CALL BACK WITH

## 2020-08-18 ENCOUNTER — OFFICE VISIT (OUTPATIENT)
Dept: OBGYN CLINIC | Facility: CLINIC | Age: 45
End: 2020-08-18
Payer: COMMERCIAL

## 2020-08-18 VITALS
BODY MASS INDEX: 37 KG/M2 | DIASTOLIC BLOOD PRESSURE: 75 MMHG | SYSTOLIC BLOOD PRESSURE: 114 MMHG | WEIGHT: 228 LBS | HEART RATE: 51 BPM

## 2020-08-18 DIAGNOSIS — N92.0 EXCESSIVE OR FREQUENT MENSTRUATION: Primary | ICD-10-CM

## 2020-08-18 PROCEDURE — 3078F DIAST BP <80 MM HG: CPT | Performed by: OBSTETRICS & GYNECOLOGY

## 2020-08-18 PROCEDURE — 3074F SYST BP LT 130 MM HG: CPT | Performed by: OBSTETRICS & GYNECOLOGY

## 2020-08-18 PROCEDURE — 58100 BIOPSY OF UTERUS LINING: CPT | Performed by: OBSTETRICS & GYNECOLOGY

## 2020-08-18 NOTE — PROCEDURES
Endometrial Biopsy    Pre-Procedure Care:   Consent was obtained. Procedure/risks were explained. Questions were answered. Correct patient was identified. Correct side and site were confirmed.     Pregnancy Results: n/a- pt not sexually active   Birth c

## 2020-08-19 ENCOUNTER — TELEPHONE (OUTPATIENT)
Dept: OBGYN CLINIC | Facility: CLINIC | Age: 45
End: 2020-08-19

## 2020-08-19 DIAGNOSIS — N95.0 POSTMENOPAUSAL BLEEDING: Primary | ICD-10-CM

## 2020-08-19 NOTE — TELEPHONE ENCOUNTER
[facility]/Central Scheduling [address] [place] [address] calling for mutual patient that was seen in clinic yesterday. Patient is waiting for order for pelvic Ultra Sound to be placed. Please call [facility]/Central scheduling at:[phone],thanks.

## 2020-09-11 ENCOUNTER — HOSPITAL ENCOUNTER (OUTPATIENT)
Dept: ULTRASOUND IMAGING | Facility: HOSPITAL | Age: 45
Discharge: HOME | End: 2020-09-11
Attending: OBSTETRICS & GYNECOLOGY
Payer: COMMERCIAL

## 2020-09-11 DIAGNOSIS — N95.0 POSTMENOPAUSAL BLEEDING: ICD-10-CM

## 2020-09-11 PROCEDURE — 76856 US EXAM PELVIC COMPLETE: CPT | Performed by: OBSTETRICS & GYNECOLOGY

## 2020-09-11 PROCEDURE — 76830 TRANSVAGINAL US NON-OB: CPT | Performed by: OBSTETRICS & GYNECOLOGY

## 2020-10-01 RX ORDER — LEVOTHYROXINE SODIUM 88 UG/1
TABLET ORAL
Qty: 90 TABLET | Refills: 0 | Status: SHIPPED | OUTPATIENT
Start: 2020-10-01 | End: 2020-10-14

## 2020-10-14 ENCOUNTER — OFFICE VISIT (OUTPATIENT)
Dept: INTERNAL MEDICINE CLINIC | Facility: CLINIC | Age: 45
End: 2020-10-14
Payer: COMMERCIAL

## 2020-10-14 VITALS
TEMPERATURE: 99 F | BODY MASS INDEX: 37.04 KG/M2 | DIASTOLIC BLOOD PRESSURE: 80 MMHG | WEIGHT: 225 LBS | HEIGHT: 65.5 IN | HEART RATE: 80 BPM | SYSTOLIC BLOOD PRESSURE: 112 MMHG

## 2020-10-14 DIAGNOSIS — R53.83 OTHER FATIGUE: ICD-10-CM

## 2020-10-14 DIAGNOSIS — Z00.00 ROUTINE HEALTH MAINTENANCE: ICD-10-CM

## 2020-10-14 DIAGNOSIS — E03.9 HYPOTHYROIDISM, UNSPECIFIED TYPE: ICD-10-CM

## 2020-10-14 DIAGNOSIS — E55.9 VITAMIN D DEFICIENCY: ICD-10-CM

## 2020-10-14 DIAGNOSIS — J45.20 MILD INTERMITTENT ASTHMA WITHOUT COMPLICATION: ICD-10-CM

## 2020-10-14 DIAGNOSIS — Z12.31 ENCOUNTER FOR SCREENING MAMMOGRAM FOR MALIGNANT NEOPLASM OF BREAST: Primary | ICD-10-CM

## 2020-10-14 DIAGNOSIS — K76.0 FATTY LIVER DISEASE, NONALCOHOLIC: ICD-10-CM

## 2020-10-14 PROCEDURE — 3008F BODY MASS INDEX DOCD: CPT | Performed by: INTERNAL MEDICINE

## 2020-10-14 PROCEDURE — 3079F DIAST BP 80-89 MM HG: CPT | Performed by: INTERNAL MEDICINE

## 2020-10-14 PROCEDURE — 90471 IMMUNIZATION ADMIN: CPT | Performed by: INTERNAL MEDICINE

## 2020-10-14 PROCEDURE — 3074F SYST BP LT 130 MM HG: CPT | Performed by: INTERNAL MEDICINE

## 2020-10-14 PROCEDURE — 99396 PREV VISIT EST AGE 40-64: CPT | Performed by: INTERNAL MEDICINE

## 2020-10-14 PROCEDURE — 90686 IIV4 VACC NO PRSV 0.5 ML IM: CPT | Performed by: INTERNAL MEDICINE

## 2020-10-14 RX ORDER — LEVOTHYROXINE SODIUM 88 UG/1
88 TABLET ORAL
Qty: 90 TABLET | Refills: 3 | Status: SHIPPED | OUTPATIENT
Start: 2020-10-14

## 2020-10-14 NOTE — PROGRESS NOTES
Alhaji Menchaca is a 50year old female. Patient presents with:  Physical: Patient is here today for a PE and flu shot. Paps per gyne. Patient states that she has been feeling okay lately. No major issues at this time.  She will need mammogram order to Maternal Grandmother         brain   • Hypertension Paternal Grandmother    • Cancer Paternal Grandmother         Breast- Age Related   • Breast Cancer Paternal Grandmother 80   • Heart Attack Maternal Grandfather    • Heart Disease Maternal Grandfather liver disease, nonalcoholic  History of fatty liver diagnosed via liver biopsy in November 2010 (done at the time of her cholecystectomy). Liver biopsy revealed macrovesicular steatosis, grade 1 inflammation, and stage II fibrosis.  At that time, serology f

## 2020-10-16 ENCOUNTER — LAB ENCOUNTER (OUTPATIENT)
Dept: LAB | Facility: HOSPITAL | Age: 45
End: 2020-10-16
Attending: INTERNAL MEDICINE
Payer: COMMERCIAL

## 2020-10-16 DIAGNOSIS — E55.9 VITAMIN D DEFICIENCY: ICD-10-CM

## 2020-10-16 DIAGNOSIS — R53.83 OTHER FATIGUE: ICD-10-CM

## 2020-10-16 DIAGNOSIS — Z00.00 ROUTINE HEALTH MAINTENANCE: ICD-10-CM

## 2020-10-16 PROCEDURE — 80061 LIPID PANEL: CPT

## 2020-10-16 PROCEDURE — 85025 COMPLETE CBC W/AUTO DIFF WBC: CPT

## 2020-10-16 PROCEDURE — 36415 COLL VENOUS BLD VENIPUNCTURE: CPT

## 2020-10-16 PROCEDURE — 84443 ASSAY THYROID STIM HORMONE: CPT | Performed by: INTERNAL MEDICINE

## 2020-10-16 PROCEDURE — 82306 VITAMIN D 25 HYDROXY: CPT

## 2020-10-16 PROCEDURE — 80053 COMPREHEN METABOLIC PANEL: CPT

## 2020-10-20 ENCOUNTER — PATIENT MESSAGE (OUTPATIENT)
Dept: INTERNAL MEDICINE CLINIC | Facility: CLINIC | Age: 45
End: 2020-10-20

## 2020-10-20 RX ORDER — CHOLECALCIFEROL (VITAMIN D3) 50 MCG
4000 TABLET ORAL DAILY
Qty: 1 TABLET | Refills: 0 | COMMUNITY
Start: 2020-10-20

## 2020-11-13 ENCOUNTER — HOSPITAL ENCOUNTER (OUTPATIENT)
Dept: MAMMOGRAPHY | Facility: HOSPITAL | Age: 45
Discharge: HOME | End: 2020-11-13
Attending: INTERNAL MEDICINE
Payer: COMMERCIAL

## 2020-11-13 DIAGNOSIS — Z12.31 ENCOUNTER FOR SCREENING MAMMOGRAM FOR MALIGNANT NEOPLASM OF BREAST: ICD-10-CM

## 2020-11-13 PROCEDURE — 77067 SCR MAMMO BI INCL CAD: CPT | Performed by: INTERNAL MEDICINE

## 2020-11-13 PROCEDURE — 77063 BREAST TOMOSYNTHESIS BI: CPT | Performed by: INTERNAL MEDICINE

## 2021-01-11 ENCOUNTER — TELEPHONE (OUTPATIENT)
Dept: OBGYN CLINIC | Facility: CLINIC | Age: 46
End: 2021-01-11

## 2021-01-11 NOTE — TELEPHONE ENCOUNTER
Informed pt that [facility] recs hysteroscopy/D&C and to schedule an appt to discuss. Pt made an appt with [facility].

## 2021-01-11 NOTE — TELEPHONE ENCOUNTER
Pt calling to report she is having pink/red spotting since 1/6. Denies any pain or clots. Pt is menopausal. Had an embx 8-18-20 and informed by [facility] to call if spotting. Sent to [facility].

## 2021-01-11 NOTE — TELEPHONE ENCOUNTER
Patient, who is post menopausal, is having light bleeding for the last few days. Dr Blake wanted to know when this happened again. Please advise.

## 2021-01-20 ENCOUNTER — OFFICE VISIT (OUTPATIENT)
Dept: OBGYN CLINIC | Facility: CLINIC | Age: 46
End: 2021-01-20
Payer: COMMERCIAL

## 2021-01-20 ENCOUNTER — TELEPHONE (OUTPATIENT)
Dept: OBGYN CLINIC | Facility: CLINIC | Age: 46
End: 2021-01-20

## 2021-01-20 VITALS
HEART RATE: 71 BPM | DIASTOLIC BLOOD PRESSURE: 78 MMHG | WEIGHT: 226.63 LBS | SYSTOLIC BLOOD PRESSURE: 126 MMHG | BODY MASS INDEX: 37 KG/M2

## 2021-01-20 DIAGNOSIS — N95.0 POSTMENOPAUSAL BLEEDING: Primary | ICD-10-CM

## 2021-01-20 DIAGNOSIS — R93.89 THICKENED ENDOMETRIUM: ICD-10-CM

## 2021-01-20 DIAGNOSIS — N95.0 PMB (POSTMENOPAUSAL BLEEDING): Primary | ICD-10-CM

## 2021-01-20 PROCEDURE — 99212 OFFICE O/P EST SF 10 MIN: CPT | Performed by: OBSTETRICS & GYNECOLOGY

## 2021-01-20 PROCEDURE — 3074F SYST BP LT 130 MM HG: CPT | Performed by: OBSTETRICS & GYNECOLOGY

## 2021-01-20 PROCEDURE — 3078F DIAST BP <80 MM HG: CPT | Performed by: OBSTETRICS & GYNECOLOGY

## 2021-01-20 NOTE — PROGRESS NOTES
Mohan Yang    3/25/1975       Patient presents with:  Consult: KEISHA D&C  she is having pink/red spotting since 1/6. Denies any pain or clots. Pt is menopausal. Had an embx 8-18-20.   Spotting has now stopped - she thinks it stopped on jan 1 spent in discussion and plan

## 2021-02-09 ENCOUNTER — LAB ENCOUNTER (OUTPATIENT)
Dept: LAB | Facility: HOSPITAL | Age: 46
End: 2021-02-09
Attending: OBSTETRICS & GYNECOLOGY
Payer: COMMERCIAL

## 2021-02-09 DIAGNOSIS — Z01.818 PREOP TESTING: ICD-10-CM

## 2021-02-09 LAB — SARS-COV-2 RNA RESP QL NAA+PROBE: NOT DETECTED

## 2021-02-11 ENCOUNTER — TELEPHONE (OUTPATIENT)
Dept: OBGYN CLINIC | Facility: CLINIC | Age: 46
End: 2021-02-11

## 2021-02-11 NOTE — TELEPHONE ENCOUNTER
Patient has a question she would like answered about tomorrow's procedures. Pre admissions only saw one procedure in the system (hyperscopy)  She wants to verify that a D&C will be done also. Please call.

## 2021-02-12 ENCOUNTER — ANESTHESIA (OUTPATIENT)
Dept: SURGERY | Facility: HOSPITAL | Age: 46
End: 2021-02-12
Payer: COMMERCIAL

## 2021-02-12 ENCOUNTER — ANESTHESIA EVENT (OUTPATIENT)
Dept: SURGERY | Facility: HOSPITAL | Age: 46
End: 2021-02-12
Payer: COMMERCIAL

## 2021-02-12 ENCOUNTER — HOSPITAL ENCOUNTER (OUTPATIENT)
Facility: HOSPITAL | Age: 46
Setting detail: HOSPITAL OUTPATIENT SURGERY
Discharge: HOME | End: 2021-02-12
Attending: OBSTETRICS & GYNECOLOGY | Admitting: OBSTETRICS & GYNECOLOGY
Payer: COMMERCIAL

## 2021-02-12 VITALS
RESPIRATION RATE: 14 BRPM | DIASTOLIC BLOOD PRESSURE: 71 MMHG | SYSTOLIC BLOOD PRESSURE: 119 MMHG | TEMPERATURE: 98 F | HEART RATE: 60 BPM | BODY MASS INDEX: 35.52 KG/M2 | OXYGEN SATURATION: 99 % | HEIGHT: 66 IN | WEIGHT: 221 LBS

## 2021-02-12 DIAGNOSIS — N95.0 PMB (POSTMENOPAUSAL BLEEDING): ICD-10-CM

## 2021-02-12 DIAGNOSIS — R93.89 THICKENED ENDOMETRIUM: ICD-10-CM

## 2021-02-12 DIAGNOSIS — Z01.818 PREOP TESTING: Primary | ICD-10-CM

## 2021-02-12 LAB — B-HCG UR QL: NEGATIVE

## 2021-02-12 PROCEDURE — 88305 TISSUE EXAM BY PATHOLOGIST: CPT | Performed by: OBSTETRICS & GYNECOLOGY

## 2021-02-12 PROCEDURE — 81025 URINE PREGNANCY TEST: CPT

## 2021-02-12 PROCEDURE — 0UDB8ZX EXTRACTION OF ENDOMETRIUM, VIA NATURAL OR ARTIFICIAL OPENING ENDOSCOPIC, DIAGNOSTIC: ICD-10-PCS | Performed by: OBSTETRICS & GYNECOLOGY

## 2021-02-12 RX ORDER — FAMOTIDINE 20 MG/1
20 TABLET ORAL ONCE
Status: COMPLETED | OUTPATIENT
Start: 2021-02-12 | End: 2021-02-12

## 2021-02-12 RX ORDER — NALOXONE HYDROCHLORIDE 0.4 MG/ML
80 INJECTION, SOLUTION INTRAMUSCULAR; INTRAVENOUS; SUBCUTANEOUS AS NEEDED
Status: DISCONTINUED | OUTPATIENT
Start: 2021-02-12 | End: 2021-02-12

## 2021-02-12 RX ORDER — SODIUM CHLORIDE, SODIUM LACTATE, POTASSIUM CHLORIDE, CALCIUM CHLORIDE 600; 310; 30; 20 MG/100ML; MG/100ML; MG/100ML; MG/100ML
INJECTION, SOLUTION INTRAVENOUS CONTINUOUS
Status: DISCONTINUED | OUTPATIENT
Start: 2021-02-12 | End: 2021-02-12

## 2021-02-12 RX ORDER — MORPHINE SULFATE 4 MG/ML
4 INJECTION, SOLUTION INTRAMUSCULAR; INTRAVENOUS EVERY 10 MIN PRN
Status: DISCONTINUED | OUTPATIENT
Start: 2021-02-12 | End: 2021-02-12

## 2021-02-12 RX ORDER — HYDROMORPHONE HYDROCHLORIDE 1 MG/ML
0.6 INJECTION, SOLUTION INTRAMUSCULAR; INTRAVENOUS; SUBCUTANEOUS EVERY 5 MIN PRN
Status: DISCONTINUED | OUTPATIENT
Start: 2021-02-12 | End: 2021-02-12

## 2021-02-12 RX ORDER — ONDANSETRON 2 MG/ML
INJECTION INTRAMUSCULAR; INTRAVENOUS AS NEEDED
Status: DISCONTINUED | OUTPATIENT
Start: 2021-02-12 | End: 2021-02-12 | Stop reason: SURG

## 2021-02-12 RX ORDER — HYDROCODONE BITARTRATE AND ACETAMINOPHEN 5; 325 MG/1; MG/1
2 TABLET ORAL AS NEEDED
Status: DISCONTINUED | OUTPATIENT
Start: 2021-02-12 | End: 2021-02-12

## 2021-02-12 RX ORDER — MIDAZOLAM HYDROCHLORIDE 1 MG/ML
INJECTION INTRAMUSCULAR; INTRAVENOUS AS NEEDED
Status: DISCONTINUED | OUTPATIENT
Start: 2021-02-12 | End: 2021-02-12 | Stop reason: SURG

## 2021-02-12 RX ORDER — HYDROCODONE BITARTRATE AND ACETAMINOPHEN 5; 325 MG/1; MG/1
1 TABLET ORAL AS NEEDED
Status: DISCONTINUED | OUTPATIENT
Start: 2021-02-12 | End: 2021-02-12

## 2021-02-12 RX ORDER — ACETAMINOPHEN 500 MG
1000 TABLET ORAL ONCE
Status: COMPLETED | OUTPATIENT
Start: 2021-02-12 | End: 2021-02-12

## 2021-02-12 RX ORDER — IBUPROFEN 600 MG/1
600 TABLET ORAL EVERY 6 HOURS
Status: DISCONTINUED | OUTPATIENT
Start: 2021-02-12 | End: 2021-02-12

## 2021-02-12 RX ORDER — METOCLOPRAMIDE 10 MG/1
10 TABLET ORAL ONCE
Status: COMPLETED | OUTPATIENT
Start: 2021-02-12 | End: 2021-02-12

## 2021-02-12 RX ORDER — HYDROMORPHONE HYDROCHLORIDE 1 MG/ML
0.4 INJECTION, SOLUTION INTRAMUSCULAR; INTRAVENOUS; SUBCUTANEOUS EVERY 5 MIN PRN
Status: DISCONTINUED | OUTPATIENT
Start: 2021-02-12 | End: 2021-02-12

## 2021-02-12 RX ORDER — DEXAMETHASONE SODIUM PHOSPHATE 4 MG/ML
VIAL (ML) INJECTION AS NEEDED
Status: DISCONTINUED | OUTPATIENT
Start: 2021-02-12 | End: 2021-02-12 | Stop reason: SURG

## 2021-02-12 RX ORDER — GLYCOPYRROLATE 0.2 MG/ML
INJECTION, SOLUTION INTRAMUSCULAR; INTRAVENOUS AS NEEDED
Status: DISCONTINUED | OUTPATIENT
Start: 2021-02-12 | End: 2021-02-12 | Stop reason: SURG

## 2021-02-12 RX ORDER — ONDANSETRON 2 MG/ML
4 INJECTION INTRAMUSCULAR; INTRAVENOUS ONCE AS NEEDED
Status: DISCONTINUED | OUTPATIENT
Start: 2021-02-12 | End: 2021-02-12

## 2021-02-12 RX ORDER — HYDROMORPHONE HYDROCHLORIDE 1 MG/ML
0.2 INJECTION, SOLUTION INTRAMUSCULAR; INTRAVENOUS; SUBCUTANEOUS EVERY 5 MIN PRN
Status: DISCONTINUED | OUTPATIENT
Start: 2021-02-12 | End: 2021-02-12

## 2021-02-12 RX ORDER — MORPHINE SULFATE 2 MG/ML
2 INJECTION, SOLUTION INTRAMUSCULAR; INTRAVENOUS EVERY 10 MIN PRN
Status: DISCONTINUED | OUTPATIENT
Start: 2021-02-12 | End: 2021-02-12

## 2021-02-12 RX ORDER — PROCHLORPERAZINE EDISYLATE 5 MG/ML
5 INJECTION INTRAMUSCULAR; INTRAVENOUS ONCE AS NEEDED
Status: DISCONTINUED | OUTPATIENT
Start: 2021-02-12 | End: 2021-02-12

## 2021-02-12 RX ORDER — ONDANSETRON 4 MG/1
4 TABLET, FILM COATED ORAL EVERY 8 HOURS PRN
Status: DISCONTINUED | OUTPATIENT
Start: 2021-02-12 | End: 2021-02-12

## 2021-02-12 RX ORDER — MORPHINE SULFATE 10 MG/ML
6 INJECTION, SOLUTION INTRAMUSCULAR; INTRAVENOUS EVERY 10 MIN PRN
Status: DISCONTINUED | OUTPATIENT
Start: 2021-02-12 | End: 2021-02-12

## 2021-02-12 RX ORDER — ONDANSETRON 2 MG/ML
4 INJECTION INTRAMUSCULAR; INTRAVENOUS EVERY 8 HOURS PRN
Status: DISCONTINUED | OUTPATIENT
Start: 2021-02-12 | End: 2021-02-12

## 2021-02-12 RX ORDER — KETOROLAC TROMETHAMINE 30 MG/ML
INJECTION, SOLUTION INTRAMUSCULAR; INTRAVENOUS AS NEEDED
Status: DISCONTINUED | OUTPATIENT
Start: 2021-02-12 | End: 2021-02-12 | Stop reason: SURG

## 2021-02-12 RX ORDER — LIDOCAINE HYDROCHLORIDE 10 MG/ML
INJECTION, SOLUTION EPIDURAL; INFILTRATION; INTRACAUDAL; PERINEURAL AS NEEDED
Status: DISCONTINUED | OUTPATIENT
Start: 2021-02-12 | End: 2021-02-12 | Stop reason: SURG

## 2021-02-12 RX ADMIN — ONDANSETRON 4 MG: 2 INJECTION INTRAMUSCULAR; INTRAVENOUS at 14:30:00

## 2021-02-12 RX ADMIN — MIDAZOLAM HYDROCHLORIDE 2 MG: 1 INJECTION INTRAMUSCULAR; INTRAVENOUS at 14:21:00

## 2021-02-12 RX ADMIN — KETOROLAC TROMETHAMINE 30 MG: 30 INJECTION, SOLUTION INTRAMUSCULAR; INTRAVENOUS at 15:07:00

## 2021-02-12 RX ADMIN — LIDOCAINE HYDROCHLORIDE 50 MG: 10 INJECTION, SOLUTION EPIDURAL; INFILTRATION; INTRACAUDAL; PERINEURAL at 14:26:00

## 2021-02-12 RX ADMIN — GLYCOPYRROLATE 0.2 MG: 0.2 INJECTION, SOLUTION INTRAMUSCULAR; INTRAVENOUS at 14:33:00

## 2021-02-12 RX ADMIN — SODIUM CHLORIDE, SODIUM LACTATE, POTASSIUM CHLORIDE, CALCIUM CHLORIDE: 600; 310; 30; 20 INJECTION, SOLUTION INTRAVENOUS at 15:01:00

## 2021-02-12 RX ADMIN — DEXAMETHASONE SODIUM PHOSPHATE 4 MG: 4 MG/ML VIAL (ML) INJECTION at 14:30:00

## 2021-02-12 NOTE — ANESTHESIA PROCEDURE NOTES
Airway  Urgency: Elective      General Information and Staff    Patient location during procedure: OR  Anesthesiologist: Nelly Truong MD  Resident/CRNA: Ruthell Dandy, CRNA  Performed: CRNA     Indications and Patient Condition  Indications for airway

## 2021-02-12 NOTE — H&P
67 Yani Warner Patient Status:  [facility]    3/25/1975 MRN [id number]   Location [place] [address] Attending Dimple Farfan MD   Hosp Day # 0 PCP Ilene Willard Grandfather         Pacemaker   • Diabetes Paternal Grandfather    • Heart Disease Paternal Grandfather      Social History:  Social History    Tobacco Use      Smoking status: Never Smoker      Smokeless tobacco: Never Used    Alcohol use:  Yes      Alcoho

## 2021-02-12 NOTE — DISCHARGE SUMMARY
[facility] HOSP - [facility]    Discharge Summary    Jean Pierre Cutler Patient Status:  [facility]    3/25/1975 MRN [id number]   [place] [address] Attending Scarlet Barnard MD   Hosp Day # 0 DEYVI Concepcion sports, or working with power tools/applicances (power saws, electric knives or mixers)   · That you have someone stay with you on your first night home   · Do not drink alcohol or take sleeping pills or tranquilizers   · Do not sign legal documents within

## 2021-02-12 NOTE — ANESTHESIA PREPROCEDURE EVALUATION
Anesthesia PreOp Note    HPI:     Delio Meier is a 50year old female who presents for preoperative consultation requested by: Latanya Law MD    Date of Surgery: 2/12/2021    Procedure(s):   MYOMECTOMY HYSTEROSCOPIC  Indication: PMB (postmeno Tab, Take 1 tablet (88 mcg total) by mouth every morning before breakfast., Disp: 90 tablet, Rfl: 3, 2/12/2021 at 0715    •  Clobetasol Propionate 0.05 % External Ointment, Apply 1 Application topically 2 (two) times daily. , Disp: 1 Tube, Rfl: 2, Past [facility] week: Not on file        Minutes per session: Not on file      Stress: Not on file    Relationships      Social connections        Talks on phone: Not on file        Gets together: Not on file        Attends Baptism service: Not on file        Active mem Cardiovascular - negative ROS  Exercise tolerance: good    Rhythm: regular  Rate: normal    Neuro/Psych - negative ROS     GI/Hepatic/Renal    (+) liver disease,     Endo/Other - negative ROS   Abdominal                Anesthesia Plan:   ASA:  2  Plan:   G

## 2021-02-12 NOTE — ANESTHESIA POSTPROCEDURE EVALUATION
Patient: Samina Landry    Procedure Summary     Date: 02/12/21 Room / Location: [address] [place] [address] MAIN OR 01 / 300 [facility] MAIN OR    Anesthesia Start: 8705 Anesthesia Stop: 1518    Procedure: MYOMECTOMY HYSTEROSCOPIC (N/A ) Diagnosis:       PMB (postmenopausal bleeding

## 2021-02-12 NOTE — OPERATIVE REPORT
[facility] OPERATING ROOM  Operative Note     Zhao Treivno Location: OR   [facility] [id number] MRN [id number]   Admission Date 2/12/2021 Operation Date 2/12/2021   Attending Physician Yamel Blanchard MD Operating Physician Katie Potter.  MD Lou

## 2021-02-25 ENCOUNTER — OFFICE VISIT (OUTPATIENT)
Dept: OBGYN CLINIC | Facility: CLINIC | Age: 46
End: 2021-02-25
Payer: COMMERCIAL

## 2021-02-25 VITALS
SYSTOLIC BLOOD PRESSURE: 122 MMHG | HEART RATE: 60 BPM | DIASTOLIC BLOOD PRESSURE: 80 MMHG | BODY MASS INDEX: 36 KG/M2 | WEIGHT: 221 LBS

## 2021-02-25 DIAGNOSIS — N95.0 POSTMENOPAUSAL BLEEDING: Primary | ICD-10-CM

## 2021-02-25 DIAGNOSIS — R93.89 THICKENED ENDOMETRIUM: ICD-10-CM

## 2021-02-25 DIAGNOSIS — Z09 POSTOP CHECK: ICD-10-CM

## 2021-02-25 PROCEDURE — 3079F DIAST BP 80-89 MM HG: CPT | Performed by: OBSTETRICS & GYNECOLOGY

## 2021-02-25 PROCEDURE — 3074F SYST BP LT 130 MM HG: CPT | Performed by: OBSTETRICS & GYNECOLOGY

## 2021-02-25 PROCEDURE — 99213 OFFICE O/P EST LOW 20 MIN: CPT | Performed by: OBSTETRICS & GYNECOLOGY

## 2021-02-25 NOTE — PROGRESS NOTES
Joseph Adams is a 50year old female  No LMP recorded. (Menstrual status: Menopause). Patient presents with:  Post-Op  . Had hysteroscopy/D&C performed on 2021. Doing well.  Path was benign but we discussed that there were no finding Minutes per session: Not on file      Stress: Not on file    Relationships      Social connections        Talks on phone: Not on file        Gets together: Not on file        Attends Jehovah's witness service: Not on file        Active member of club or Darcy itching  Musculoskeletal:  denies back pain. Skin/Breast:  Denies any breast pain, lumps, or discharge. Neurological:  denies headaches, extremity weakness or numbness. Psychiatric: denies depression or anxiety.   Endocrine:   denies excessive thirst or

## 2021-03-25 ENCOUNTER — IMMUNIZATION (OUTPATIENT)
Dept: LAB | Facility: HOSPITAL | Age: 46
End: 2021-03-25
Attending: HOSPITALIST
Payer: COMMERCIAL

## 2021-03-25 DIAGNOSIS — Z23 NEED FOR VACCINATION: Primary | ICD-10-CM

## 2021-03-25 PROCEDURE — 0011A SARSCOV2 VAC 100MCG/0.5ML IM: CPT

## 2021-04-22 ENCOUNTER — IMMUNIZATION (OUTPATIENT)
Dept: LAB | Facility: HOSPITAL | Age: 46
End: 2021-04-22
Attending: EMERGENCY MEDICINE
Payer: COMMERCIAL

## 2021-04-22 DIAGNOSIS — Z23 NEED FOR VACCINATION: Primary | ICD-10-CM

## 2021-04-22 PROCEDURE — 0012A SARSCOV2 VAC 100MCG/0.5ML IM: CPT

## 2021-10-07 ENCOUNTER — HOSPITAL ENCOUNTER (OUTPATIENT)
Dept: GENERAL RADIOLOGY | Facility: HOSPITAL | Age: 46
Discharge: HOME | End: 2021-10-07
Attending: ORTHOPAEDIC SURGERY
Payer: COMMERCIAL

## 2021-10-07 ENCOUNTER — OFFICE VISIT (OUTPATIENT)
Dept: ORTHOPEDICS CLINIC | Facility: CLINIC | Age: 46
End: 2021-10-07
Payer: COMMERCIAL

## 2021-10-07 DIAGNOSIS — M25.511 RIGHT SHOULDER PAIN, UNSPECIFIED CHRONICITY: ICD-10-CM

## 2021-10-07 DIAGNOSIS — S46.011A STRAIN OF RIGHT ROTATOR CUFF CAPSULE, INITIAL ENCOUNTER: Primary | ICD-10-CM

## 2021-10-07 PROCEDURE — 73030 X-RAY EXAM OF SHOULDER: CPT | Performed by: ORTHOPAEDIC SURGERY

## 2021-10-07 PROCEDURE — 99244 OFF/OP CNSLTJ NEW/EST MOD 40: CPT | Performed by: ORTHOPAEDIC SURGERY

## 2021-10-07 NOTE — PROGRESS NOTES
NURSING INTAKE COMMENTS: Patient presents with:  New Patient: Right shoulder injury in April, moving a concerete bird bath fixture. Patient felt a pull in the shoulder. No recent xrays of right shoulder. With certain movements patient rates pain 7/10. Mother    • Cancer Maternal Grandmother         brain   • Hypertension Paternal Grandmother    • Cancer Paternal Grandmother         Breast- Age Related   • Breast Cancer Paternal Grandmother 80   • Heart Attack Maternal Grandfather    • Heart Disease Mate alert and responsive, no acute distress noted  Extremities: Cervical spine range of motion full. No exacerbation of symptoms with motion. Spurling sign negative. Examination of the right shoulder is no obvious atrophy no prominence.   She is tender ove physical therapy. Suggested consistent use of oral ibuprofen. Advised icing and activity modifications. Follow-up again in 4 weeks. If symptoms persist, consider an MRI. Follow Up: Return in about 4 weeks (around 11/4/2021).     Elisa Catherine MD

## 2021-10-08 ENCOUNTER — TELEPHONE (OUTPATIENT)
Dept: PHYSICAL THERAPY | Facility: HOSPITAL | Age: 46
End: 2021-10-08

## 2021-10-26 ENCOUNTER — OFFICE VISIT (OUTPATIENT)
Dept: PHYSICAL THERAPY | Facility: HOSPITAL | Age: 46
End: 2021-10-26
Attending: ORTHOPAEDIC SURGERY
Payer: COMMERCIAL

## 2021-10-26 DIAGNOSIS — S46.011A STRAIN OF RIGHT ROTATOR CUFF CAPSULE, INITIAL ENCOUNTER: ICD-10-CM

## 2021-10-26 PROCEDURE — 97161 PT EVAL LOW COMPLEX 20 MIN: CPT

## 2021-10-26 PROCEDURE — 97110 THERAPEUTIC EXERCISES: CPT

## 2021-10-26 NOTE — PROGRESS NOTES
SHOULDER EVALUATION:   Referring Physician: Dr. Yancy Rivero  Diagnosis: Strain of right rotator cuff capsule, initial encounter (W75.709Y)     Date of Service: 10/26/2021     PATIENT SUMMARY   Mello Peguero is a 48year old female who presents to t Bronchospasm     severe; hospital 7/10   • Fatty liver 11/2010    liver bx   • Hypothyroidism    • Obesity    • Raynaud disease     hands         Germán Records presents to physical therapy evaluation with primary c/o R shoulder pain.  The results of the evaluation, modalities as needed [ice/heat], postural corrections and importance of remaining active. Pt educated about importance of posture and how RTC can become inflamed and irritated due to poor mechanics.  Discussed how injury can occur and begin irri agreed to actively participate in planning and for this course of care. Thank you for your referral. Please co-sign or sign and return this letter via fax as soon as possible to [phone].  If you have any questions, please contact me at Dept: [phone]

## 2021-10-28 ENCOUNTER — OFFICE VISIT (OUTPATIENT)
Dept: PHYSICAL THERAPY | Facility: HOSPITAL | Age: 46
End: 2021-10-28
Attending: ORTHOPAEDIC SURGERY
Payer: COMMERCIAL

## 2021-10-28 PROCEDURE — 97112 NEUROMUSCULAR REEDUCATION: CPT

## 2021-10-28 PROCEDURE — 97110 THERAPEUTIC EXERCISES: CPT

## 2021-10-28 PROCEDURE — 97140 MANUAL THERAPY 1/> REGIONS: CPT

## 2021-10-28 NOTE — PROGRESS NOTES
Dx: Strain of right rotator cuff capsule, initial encounter ([phone]           Insurance (Authorized # of Visits):  Vanesa Fink (12 per POC)           Authorizing Physician: Dr. Dunia Houston  Next MD visit: 11/3/2021  Fall Risk: standard         Precautions: n will display improved score on the outcome measure to 90% to demonstrate increased functional ability. Pt will be able to lift light-mod weights/objects with proper mechanics and min to no pain.    Pt will be able to reach [facility] and perform ADL's, like hooki

## 2021-11-03 ENCOUNTER — OFFICE VISIT (OUTPATIENT)
Dept: PHYSICAL THERAPY | Facility: HOSPITAL | Age: 46
End: 2021-11-03
Attending: ORTHOPAEDIC SURGERY
Payer: COMMERCIAL

## 2021-11-03 PROCEDURE — 97110 THERAPEUTIC EXERCISES: CPT

## 2021-11-03 PROCEDURE — 97112 NEUROMUSCULAR REEDUCATION: CPT

## 2021-11-03 PROCEDURE — 97140 MANUAL THERAPY 1/> REGIONS: CPT

## 2021-11-03 NOTE — PROGRESS NOTES
Dx: Strain of right rotator cuff capsule, initial encounter ([phone]           Insurance (Authorized # of Visits):  Maricruz Cardenas (12 per POC)           Authorizing Physician: Dr. David Gamez  Next MD visit: 11/3/2021  Fall Risk: standard         Precautions: n decreased pain to <3/10 during functional tasks and ADL's. Pt will display improved score on the outcome measure to 90% to demonstrate increased functional ability.    Pt will be able to lift light-mod weights/objects with proper mechanics and min to no p

## 2021-11-05 ENCOUNTER — OFFICE VISIT (OUTPATIENT)
Dept: PHYSICAL THERAPY | Facility: HOSPITAL | Age: 46
End: 2021-11-05
Attending: ORTHOPAEDIC SURGERY
Payer: COMMERCIAL

## 2021-11-05 PROCEDURE — 97140 MANUAL THERAPY 1/> REGIONS: CPT

## 2021-11-05 PROCEDURE — 97110 THERAPEUTIC EXERCISES: CPT

## 2021-11-05 PROCEDURE — 97112 NEUROMUSCULAR REEDUCATION: CPT

## 2021-11-05 NOTE — PROGRESS NOTES
Dx: Strain of right rotator cuff capsule, initial encounter ([phone]           Insurance (Authorized # of Visits):  Angie Blackmon (12 per POC)           Authorizing Physician: Dr. Asia Newby  Next MD visit: 11/3/2021  Fall Risk: standard         Precautions: n Pt will demonstrate decreased pain to <3/10 during functional tasks and ADL's. Pt will display improved score on the outcome measure to 90% to demonstrate increased functional ability.    Pt will be able to lift light-mod weights/objects with proper mec 10x5  -Wall slides with focuson scap upward rotation - 2x10, 5\"        HEP: see pt instructions  HEP: SA punches, high rows    Charges: Man -1, TherEx - 1, Neuro - 1         Total Timed Treatment: 43 min  Total Treatment Time: 45 min

## 2021-11-10 ENCOUNTER — OFFICE VISIT (OUTPATIENT)
Dept: PHYSICAL THERAPY | Facility: HOSPITAL | Age: 46
End: 2021-11-10
Attending: ORTHOPAEDIC SURGERY
Payer: COMMERCIAL

## 2021-11-10 PROCEDURE — 97110 THERAPEUTIC EXERCISES: CPT

## 2021-11-10 PROCEDURE — 97112 NEUROMUSCULAR REEDUCATION: CPT

## 2021-11-10 PROCEDURE — 97140 MANUAL THERAPY 1/> REGIONS: CPT

## 2021-11-10 NOTE — PROGRESS NOTES
Dx: Strain of right rotator cuff capsule, initial encounter ([phone]           Insurance (Authorized # of Visits):  Dipika Li (12 per POC)           Authorizing Physician: Dr. Hermelinda Franco  Next MD visit: 11/3/2021  Fall Risk: standard         Precautions: n with HEP to assist with pain management and continue to improve function. Pt will demonstrate decreased pain to <3/10 during functional tasks and ADL's.    Pt will display improved score on the outcome measure to 90% to demonstrate increased functional ab 2x15  Wall slides with focus on scap upward rotation - 2x10, 5\" Neuro Yared:  -Quadruped rock backs - 10x5\" (NP)  -Scap squeezes - 5x5\"  -Mid rows with GTB - 2x15  -Low rows with GTB - 2x15  -ER/IR with GTB walkout 5x5\"  -Wall slides with focus on scap

## 2021-11-12 ENCOUNTER — OFFICE VISIT (OUTPATIENT)
Dept: PHYSICAL THERAPY | Facility: HOSPITAL | Age: 46
End: 2021-11-12
Attending: ORTHOPAEDIC SURGERY
Payer: COMMERCIAL

## 2021-11-12 PROCEDURE — 97112 NEUROMUSCULAR REEDUCATION: CPT

## 2021-11-12 PROCEDURE — 97140 MANUAL THERAPY 1/> REGIONS: CPT

## 2021-11-12 PROCEDURE — 97110 THERAPEUTIC EXERCISES: CPT

## 2021-11-12 NOTE — PROGRESS NOTES
Dx: Strain of right rotator cuff capsule, initial encounter ([phone]           Insurance (Authorized # of Visits):  Parmjit Caceres (12 per POC)           Authorizing Physician: Dr. Crystal Mann  Next MD visit: 11/3/2021  Fall Risk: standard         Precautions: n continue to improve function. Pt will demonstrate decreased pain to <3/10 during functional tasks and ADL's. Pt will display improved score on the outcome measure to 90% to demonstrate increased functional ability.    Pt will be able to lift light-mod w back/fwd  -Pulleys into flex - 20  -Pulley IR 10x10\"  -Standing bicep curl with punch out and eccentric lower, 3# - 2x10  -Sidelying ER 1x10 with 2# (c/o tired and fatigued), without weight able to do x20  -Supine SA punch x20 with 3#  -Prone Y, T x15 (c/

## 2021-11-17 ENCOUNTER — APPOINTMENT (OUTPATIENT)
Dept: PHYSICAL THERAPY | Facility: HOSPITAL | Age: 46
End: 2021-11-17
Attending: ORTHOPAEDIC SURGERY
Payer: COMMERCIAL

## 2021-11-19 ENCOUNTER — OFFICE VISIT (OUTPATIENT)
Dept: PHYSICAL THERAPY | Facility: HOSPITAL | Age: 46
End: 2021-11-19
Attending: ORTHOPAEDIC SURGERY
Payer: COMMERCIAL

## 2021-11-19 PROCEDURE — 97140 MANUAL THERAPY 1/> REGIONS: CPT

## 2021-11-19 PROCEDURE — 97110 THERAPEUTIC EXERCISES: CPT

## 2021-11-19 PROCEDURE — 97112 NEUROMUSCULAR REEDUCATION: CPT

## 2021-11-19 NOTE — PROGRESS NOTES
Dx: Strain of right rotator cuff capsule, initial encounter ([phone]           Insurance (Authorized # of Visits):  Oscar Dhaliwal (12 per POC)           Authorizing Physician: Dr. Osmani Walton  Next MD visit: 11/3/2021  Fall Risk: standard         Precautions: n awareness during functional tasks. Pt will be independent with HEP to assist with pain management and continue to improve function. Pt will demonstrate decreased pain to <3/10 during functional tasks and ADL's.    Pt will display improved score on the o eccentric lower, 3# - 2x10  -Sidelying ER 1x10 with 2# (c/o tired and fatigued), without weight able to do x20  -Supine SA punch x20 with 3#  -Prone Y, T x15 (c/o tight no pain)  -Prone ER/IR x10 (c/o tight no pain) TherEx:  -Scifit L3, 3 min back/fwd  -Pu scap upward rotation - 2x10, 5\"   -Supine ER with YTB x15 (c/o tired and tightness)  -Supine flex with 2# x20  -Supine chest press with 2# x20  -Supine SA punch with 2# x20   Neuro Yared:  -Scap squeezes - 15x5\"  -Standing pull down with balck TB x20  -Mi

## 2021-11-23 ENCOUNTER — OFFICE VISIT (OUTPATIENT)
Dept: PHYSICAL THERAPY | Facility: HOSPITAL | Age: 46
End: 2021-11-23
Attending: ORTHOPAEDIC SURGERY
Payer: COMMERCIAL

## 2021-11-23 PROCEDURE — 97110 THERAPEUTIC EXERCISES: CPT

## 2021-11-23 NOTE — PROGRESS NOTES
[facility]  Pt has attended 8 visits in Physical Therapy.      Dx: Strain of right rotator cuff capsule, initial encounter ([phone]           Insurance (Authorized # of Visits):  Cynthia Mon (12 per POC)           Authorizing Physician: Dr. Loreto Ordoñez assist with pain management and continue to improve function.  - met for current program  Pt will demonstrate decreased pain to <3/10 during functional tasks and ADL's. - not met  Pt will display improved score on the outcome measure to 90% to demonstrate i ER/IR x10 (c/o tight no pain) TherEx:  -Scifit L3, 3 min back/fwd  -Pulleys into flex - 20  -Pulley IR 10x10\"  -Standing bicep curl with punch out and eccentric lower, 3# - 2x10  -Sidelying ER 1x10 with 2# (c/o tired and fatigued), without weight able to x20  -Supine SA punch with 2# x20       Neuro Yared:  None performed this date   HEP: no new exercises this date    Charges:  TherEx - 1   Total Timed Treatment: 15 min  Total Treatment Time: 20 min

## 2021-11-24 ENCOUNTER — APPOINTMENT (OUTPATIENT)
Dept: PHYSICAL THERAPY | Facility: HOSPITAL | Age: 46
End: 2021-11-24
Payer: COMMERCIAL

## 2021-11-26 ENCOUNTER — APPOINTMENT (OUTPATIENT)
Dept: PHYSICAL THERAPY | Facility: HOSPITAL | Age: 46
End: 2021-11-26
Attending: ORTHOPAEDIC SURGERY
Payer: COMMERCIAL

## 2021-11-26 ENCOUNTER — APPOINTMENT (OUTPATIENT)
Dept: PHYSICAL THERAPY | Facility: HOSPITAL | Age: 46
End: 2021-11-26
Payer: COMMERCIAL

## 2021-11-30 ENCOUNTER — PATIENT MESSAGE (OUTPATIENT)
Dept: ORTHOPEDICS CLINIC | Facility: CLINIC | Age: 46
End: 2021-11-30

## 2021-12-01 ENCOUNTER — PATIENT MESSAGE (OUTPATIENT)
Dept: ORTHOPEDICS CLINIC | Facility: CLINIC | Age: 46
End: 2021-12-01

## 2021-12-01 NOTE — TELEPHONE ENCOUNTER
Dr. Jennifer Lind  Per your last office note, \"Follow-up again in 4 weeks. If symptoms persist, consider an MRI. \"    Please confirm if you want the pt to be seen in the office first or will you order an MRI? Thank you.

## 2021-12-01 NOTE — TELEPHONE ENCOUNTER
From: Etelvina Underwood  To: Elisa Catherine MD  Sent: 11/30/2021 6:03 PM CST  Subject: Request for MRI    Good evening,   I completed my sessions of physical therapy and I am still experiencing the discomfort.   I am hoping that you can put in a MRI

## 2021-12-03 ENCOUNTER — APPOINTMENT (OUTPATIENT)
Dept: PHYSICAL THERAPY | Facility: HOSPITAL | Age: 46
End: 2021-12-03
Payer: COMMERCIAL

## 2021-12-21 ENCOUNTER — HOSPITAL ENCOUNTER (OUTPATIENT)
Dept: MRI IMAGING | Age: 46
Discharge: HOME | End: 2021-12-21
Attending: ORTHOPAEDIC SURGERY
Payer: COMMERCIAL

## 2021-12-21 DIAGNOSIS — S46.011A STRAIN OF RIGHT ROTATOR CUFF CAPSULE, INITIAL ENCOUNTER: ICD-10-CM

## 2021-12-21 PROCEDURE — 73221 MRI JOINT UPR EXTREM W/O DYE: CPT | Performed by: ORTHOPAEDIC SURGERY

## 2022-01-21 ENCOUNTER — IMMUNIZATION (OUTPATIENT)
Dept: LAB | Facility: HOSPITAL | Age: 47
End: 2022-01-21
Attending: EMERGENCY MEDICINE
Payer: COMMERCIAL

## 2022-01-21 DIAGNOSIS — Z23 NEED FOR VACCINATION: Primary | ICD-10-CM

## 2022-01-21 PROCEDURE — 0064A SARSCOV2 VAC 50MCG/0.25ML IM: CPT

## 2022-01-27 ENCOUNTER — OFFICE VISIT (OUTPATIENT)
Dept: ORTHOPEDICS CLINIC | Facility: CLINIC | Age: 47
End: 2022-01-27
Payer: COMMERCIAL

## 2022-01-27 VITALS — SYSTOLIC BLOOD PRESSURE: 102 MMHG | HEART RATE: 65 BPM | DIASTOLIC BLOOD PRESSURE: 64 MMHG

## 2022-01-27 DIAGNOSIS — M75.01 ADHESIVE CAPSULITIS OF RIGHT SHOULDER: Primary | ICD-10-CM

## 2022-01-27 PROCEDURE — 99213 OFFICE O/P EST LOW 20 MIN: CPT | Performed by: ORTHOPAEDIC SURGERY

## 2022-01-27 PROCEDURE — 3074F SYST BP LT 130 MM HG: CPT | Performed by: ORTHOPAEDIC SURGERY

## 2022-01-27 PROCEDURE — 20610 DRAIN/INJ JOINT/BURSA W/O US: CPT | Performed by: ORTHOPAEDIC SURGERY

## 2022-01-27 PROCEDURE — 3078F DIAST BP <80 MM HG: CPT | Performed by: ORTHOPAEDIC SURGERY

## 2022-01-27 RX ORDER — TRIAMCINOLONE ACETONIDE 40 MG/ML
40 INJECTION, SUSPENSION INTRA-ARTICULAR; INTRAMUSCULAR ONCE
Status: COMPLETED | OUTPATIENT
Start: 2022-01-27 | End: 2022-01-27

## 2022-01-27 RX ADMIN — TRIAMCINOLONE ACETONIDE 40 MG: 40 INJECTION, SUSPENSION INTRA-ARTICULAR; INTRAMUSCULAR at 16:20:00

## 2022-01-27 NOTE — PROGRESS NOTES
NURSING INTAKE COMMENTS: Patient presents with:  Shoulder Pain: Right f/u and MRI results - states she still has pain with certain movements rated as 4-5/10 on and off       HPI: This 48year old female presents today with complaints of right shoulder pain Grandfather    • Heart Disease Maternal Grandfather         Pacemaker   • Diabetes Paternal Grandfather    • Heart Disease Paternal Grandfather        Social History    Occupational History      Not on file    Tobacco Use      Smoking status: Never Smoker 140 degrees with pain. Active and passive external rotation to 50 degrees with pain. Passive internal rotation 50 to 60 degrees with pain. Abduction external rotation belly press running 5-5. Koch impingement sign mildly positive.   Neurological: Right h Up: Return in about 6 weeks (around 3/10/2022).     Geo Dumont MD

## 2022-01-27 NOTE — PROGRESS NOTES
Per verbal order from LILY George, draw up 3ml of 0.5% Marcaine & 2ml 1% lidocaine and 1ml of Kenalog 40 for cortisone injection to right shoulder. Yessica Herrera    Patient provided education handout for cortisone injection.

## 2022-02-07 ENCOUNTER — TELEPHONE (OUTPATIENT)
Dept: OBGYN CLINIC | Facility: CLINIC | Age: 47
End: 2022-02-07

## 2022-02-07 NOTE — TELEPHONE ENCOUNTER
Pt is post menopausal.  She had a hysteroscopy/D&C in 2/2021 for bleeding. Pt states she had not had any bleeding since that surgery. On 2/5/2022 pt states she started bleeding. It has continued over the weekend and she is still bleeding. Pt states it is bright red, and more of a flow than spotting. Pt changes her pad every 6 hours for hygiene. Pt denies any cramping or pain. Pt informed message will be sent to CAP for recs.

## 2022-02-08 NOTE — TELEPHONE ENCOUNTER
Patient calling to follow up. She continues to bleed heavily and would like to know Dr Blake's recommendations.

## 2022-02-08 NOTE — TELEPHONE ENCOUNTER
Pt informed that message below was sent to CAP yesterday and we are awaiting her response. Pt stated she just wanted to let CAP know that her bleeding has continued and its getting heavier. Pt denies saturating through pads or tampons in an hour or less and denies severe pain/cramping. Pt given bleeding and ER precautions. Pt informed we will contact her as soon as CAP responds. There are no appts to offer pt this week or next week.

## 2022-02-10 NOTE — TELEPHONE ENCOUNTER
LMTCB. CAP stated \"please schedule her for EMBX--1st available--and schedule a pelvic US for PMB\". Order placed for pelvic US.

## 2022-02-10 NOTE — TELEPHONE ENCOUNTER
Pt returning call.   Pt Scheduled ultrasound  Was there going to be an appt prior to [facility] to discuss results of ultrasound    Please advise

## 2022-02-10 NOTE — TELEPHONE ENCOUNTER
Notified pt of CAP's recs for pelvis US and embx. Pt states the US is scheduled on 2/16. Informed pt there are no available appts until 4/4 (next available slot of any kind). Informed pt message will be sent to CAP to ask if pt can be added sooner?

## 2022-02-15 ENCOUNTER — PATIENT MESSAGE (OUTPATIENT)
Dept: OBGYN CLINIC | Facility: CLINIC | Age: 47
End: 2022-02-15

## 2022-02-15 NOTE — TELEPHONE ENCOUNTER
I apologized to pt for the wait and explained that we were waiting for CAP to respond with dates she could squeeze pt in. There was a cancellation on 2/17. Pt states she can make that appt. Appt scheduled. Pt advised to take 600mg of ibuprofen 1 hour before appt. Pt states she has US scheduled on 2/16. Pt informed results should be in before her appt with CAP so CAP can review them at appt.

## 2022-02-16 ENCOUNTER — HOSPITAL ENCOUNTER (OUTPATIENT)
Dept: ULTRASOUND IMAGING | Facility: HOSPITAL | Age: 47
Discharge: HOME | End: 2022-02-16
Attending: OBSTETRICS & GYNECOLOGY
Payer: COMMERCIAL

## 2022-02-16 DIAGNOSIS — N95.0 POSTMENOPAUSAL BLEEDING: ICD-10-CM

## 2022-02-16 PROCEDURE — 76856 US EXAM PELVIC COMPLETE: CPT | Performed by: OBSTETRICS & GYNECOLOGY

## 2022-02-16 PROCEDURE — 76830 TRANSVAGINAL US NON-OB: CPT | Performed by: OBSTETRICS & GYNECOLOGY

## 2022-02-16 NOTE — TELEPHONE ENCOUNTER
Notified pt CAP will add her tomorrow morning. Pt states a nurse called her yesterday and pt has appt at 9:50 tomorrow. See 2/15 TE. Apologized to pt for the repeat info.

## 2022-02-17 ENCOUNTER — OFFICE VISIT (OUTPATIENT)
Dept: OBGYN CLINIC | Facility: CLINIC | Age: 47
End: 2022-02-17
Payer: COMMERCIAL

## 2022-02-17 VITALS
SYSTOLIC BLOOD PRESSURE: 107 MMHG | HEART RATE: 61 BPM | WEIGHT: 221 LBS | BODY MASS INDEX: 36 KG/M2 | DIASTOLIC BLOOD PRESSURE: 72 MMHG

## 2022-02-17 DIAGNOSIS — Z12.31 VISIT FOR SCREENING MAMMOGRAM: ICD-10-CM

## 2022-02-17 DIAGNOSIS — N95.0 PMB (POSTMENOPAUSAL BLEEDING): Primary | ICD-10-CM

## 2022-02-17 PROCEDURE — 58100 BIOPSY OF UTERUS LINING: CPT | Performed by: OBSTETRICS & GYNECOLOGY

## 2022-02-17 PROCEDURE — 3078F DIAST BP <80 MM HG: CPT | Performed by: OBSTETRICS & GYNECOLOGY

## 2022-02-17 PROCEDURE — 3074F SYST BP LT 130 MM HG: CPT | Performed by: OBSTETRICS & GYNECOLOGY

## 2022-02-18 RX ORDER — CLOBETASOL PROPIONATE 0.5 MG/G
1 OINTMENT TOPICAL 2 TIMES DAILY
Qty: 1 EACH | Refills: 1 | Status: SHIPPED | OUTPATIENT
Start: 2022-02-18

## 2022-02-18 NOTE — PROCEDURES
Endometrial Biopsy    Pre-Procedure Care:   Consent was obtained. Procedure/risks were explained. Questions were answered. Correct patient was identified. Correct side and site were confirmed. Pregnancy Results: negative from n/a test   Birth control method(s) used:  postmenopausal    Pre-Medications: The patient was premedicated with Ibuprofen . Description of Procedure:  Under satisfactory analgesia, the patient was prepped and draped in the dorsal lithotomy position. A bivalve speculum was placed in the vagina and the cervix was prepped with Betadine solution. Single tooth tenaculum placed at the 12 o'clock position. The uterine cavity was sounded at 7  cm. The endometrial cavity was curetted for pipelle tissue sampling, 1   passes. Specimen was sent to pathology. The single tooth tenaculum was removed. Good hemostasis was noted. There were no complications. There was no blood loss. Discharge instructions were provided to the patient. Visit Plan:  Await final pathology prior to treatment.   Pt requested mammogram order and refill on her steroid cream for LC

## 2022-02-26 RX ORDER — LEVOTHYROXINE SODIUM 88 UG/1
TABLET ORAL
Qty: 30 TABLET | Refills: 0 | Status: SHIPPED | OUTPATIENT
Start: 2022-02-26 | End: 2022-03-24

## 2022-02-26 RX ORDER — LEVOTHYROXINE SODIUM 88 UG/1
TABLET ORAL
Qty: 90 TABLET | Refills: 0 | OUTPATIENT
Start: 2022-02-26

## 2022-02-28 ENCOUNTER — APPOINTMENT (OUTPATIENT)
Dept: GENERAL RADIOLOGY | Age: 47
End: 2022-02-28
Attending: PHYSICIAN ASSISTANT
Payer: COMMERCIAL

## 2022-02-28 ENCOUNTER — HOSPITAL ENCOUNTER (OUTPATIENT)
Age: 47
Discharge: HOME | End: 2022-02-28
Payer: COMMERCIAL

## 2022-02-28 VITALS
SYSTOLIC BLOOD PRESSURE: 130 MMHG | TEMPERATURE: 98 F | OXYGEN SATURATION: 100 % | DIASTOLIC BLOOD PRESSURE: 66 MMHG | HEART RATE: 77 BPM | RESPIRATION RATE: 22 BRPM

## 2022-02-28 DIAGNOSIS — J18.9 COMMUNITY ACQUIRED PNEUMONIA OF LEFT LOWER LOBE OF LUNG: Primary | ICD-10-CM

## 2022-02-28 LAB
#MXD IC: 0.5 X10ˆ3/UL (ref 0.1–1)
BUN BLD-MCNC: 6 MG/DL (ref 7–18)
CHLORIDE BLD-SCNC: 102 MMOL/L (ref 98–112)
CO2 BLD-SCNC: 25 MMOL/L (ref 21–32)
CREAT BLD-MCNC: 0.8 MG/DL
DDIMER WHOLE BLOOD: 338 NG/ML DDU (ref ?–400)
GLUCOSE BLD-MCNC: 93 MG/DL (ref 70–99)
HCT VFR BLD AUTO: 41.2 %
HCT VFR BLD CALC: 43 %
HGB BLD-MCNC: 13 G/DL
ISTAT IONIZED CALCIUM FOR CHEM 8: 1.2 MMOL/L (ref 1.12–1.32)
LYMPHOCYTES # BLD AUTO: 1 X10ˆ3/UL (ref 1–4)
LYMPHOCYTES NFR BLD AUTO: 24 %
MCH RBC QN AUTO: 29 PG (ref 26–34)
MCHC RBC AUTO-ENTMCNC: 31.6 G/DL (ref 31–37)
MCV RBC AUTO: 92 FL (ref 80–100)
MIXED CELL %: 11.5 %
NEUTROPHILS # BLD AUTO: 2.8 X10ˆ3/UL (ref 1.5–7.7)
NEUTROPHILS NFR BLD AUTO: 64.5 %
PLATELET # BLD AUTO: 165 X10ˆ3/UL (ref 150–450)
POTASSIUM BLD-SCNC: 4.6 MMOL/L (ref 3.6–5.1)
RBC # BLD AUTO: 4.48 X10ˆ6/UL
SARS-COV-2 RNA RESP QL NAA+PROBE: NOT DETECTED
SODIUM BLD-SCNC: 139 MMOL/L (ref 136–145)
TROPONIN I BLD-MCNC: <0.02 NG/ML
WBC # BLD AUTO: 4.3 X10ˆ3/UL (ref 4–11)

## 2022-02-28 PROCEDURE — 80047 BASIC METABLC PNL IONIZED CA: CPT

## 2022-02-28 PROCEDURE — 36415 COLL VENOUS BLD VENIPUNCTURE: CPT

## 2022-02-28 PROCEDURE — 99214 OFFICE O/P EST MOD 30 MIN: CPT

## 2022-02-28 PROCEDURE — 99204 OFFICE O/P NEW MOD 45 MIN: CPT

## 2022-02-28 PROCEDURE — 85025 COMPLETE CBC W/AUTO DIFF WBC: CPT | Performed by: PHYSICIAN ASSISTANT

## 2022-02-28 PROCEDURE — 71046 X-RAY EXAM CHEST 2 VIEWS: CPT | Performed by: PHYSICIAN ASSISTANT

## 2022-02-28 PROCEDURE — 85378 FIBRIN DEGRADE SEMIQUANT: CPT | Performed by: PHYSICIAN ASSISTANT

## 2022-02-28 PROCEDURE — 84484 ASSAY OF TROPONIN QUANT: CPT

## 2022-02-28 RX ORDER — AMOXICILLIN 500 MG/1
1000 TABLET, FILM COATED ORAL 3 TIMES DAILY
Qty: 42 TABLET | Refills: 0 | Status: SHIPPED | OUTPATIENT
Start: 2022-02-28 | End: 2022-03-07

## 2022-02-28 RX ORDER — PREDNISONE 20 MG/1
40 TABLET ORAL DAILY
Qty: 10 TABLET | Refills: 0 | Status: SHIPPED | OUTPATIENT
Start: 2022-02-28 | End: 2022-03-05

## 2022-03-24 ENCOUNTER — OFFICE VISIT (OUTPATIENT)
Dept: INTERNAL MEDICINE CLINIC | Facility: CLINIC | Age: 47
End: 2022-03-24
Payer: COMMERCIAL

## 2022-03-24 ENCOUNTER — LAB ENCOUNTER (OUTPATIENT)
Dept: LAB | Age: 47
End: 2022-03-24
Attending: INTERNAL MEDICINE
Payer: COMMERCIAL

## 2022-03-24 VITALS
DIASTOLIC BLOOD PRESSURE: 78 MMHG | SYSTOLIC BLOOD PRESSURE: 132 MMHG | TEMPERATURE: 98 F | HEIGHT: 65.9 IN | OXYGEN SATURATION: 98 % | HEART RATE: 78 BPM | BODY MASS INDEX: 36.92 KG/M2 | WEIGHT: 227 LBS

## 2022-03-24 DIAGNOSIS — K76.0 FATTY LIVER DISEASE, NONALCOHOLIC: ICD-10-CM

## 2022-03-24 DIAGNOSIS — Z00.00 ROUTINE HEALTH MAINTENANCE: ICD-10-CM

## 2022-03-24 DIAGNOSIS — E03.9 HYPOTHYROIDISM, UNSPECIFIED TYPE: ICD-10-CM

## 2022-03-24 DIAGNOSIS — J45.20 MILD INTERMITTENT ASTHMA WITHOUT COMPLICATION: ICD-10-CM

## 2022-03-24 DIAGNOSIS — E55.9 VITAMIN D DEFICIENCY: Primary | ICD-10-CM

## 2022-03-24 DIAGNOSIS — E55.9 VITAMIN D DEFICIENCY: ICD-10-CM

## 2022-03-24 LAB
ALBUMIN SERPL-MCNC: 4.3 G/DL (ref 3.4–5)
ALBUMIN/GLOB SERPL: 1.3 {RATIO} (ref 1–2)
ALP LIVER SERPL-CCNC: 104 U/L
ALT SERPL-CCNC: 94 U/L
ANION GAP SERPL CALC-SCNC: 9 MMOL/L (ref 0–18)
AST SERPL-CCNC: 75 U/L (ref 15–37)
BASOPHILS # BLD AUTO: 0.06 X10(3) UL (ref 0–0.2)
BASOPHILS NFR BLD AUTO: 1.1 %
BILIRUB SERPL-MCNC: 0.9 MG/DL (ref 0.1–2)
BUN BLD-MCNC: 15 MG/DL (ref 7–18)
BUN/CREAT SERPL: 13 (ref 10–20)
CALCIUM BLD-MCNC: 9.6 MG/DL (ref 8.5–10.1)
CHLORIDE SERPL-SCNC: 101 MMOL/L (ref 98–112)
CHOLEST SERPL-MCNC: 211 MG/DL (ref ?–200)
CO2 SERPL-SCNC: 28 MMOL/L (ref 21–32)
CREAT BLD-MCNC: 1.15 MG/DL
DEPRECATED RDW RBC AUTO: 43.8 FL (ref 35.1–46.3)
EOSINOPHIL # BLD AUTO: 0.22 X10(3) UL (ref 0–0.7)
EOSINOPHIL NFR BLD AUTO: 4 %
ERYTHROCYTE [DISTWIDTH] IN BLOOD BY AUTOMATED COUNT: 13.2 % (ref 11–15)
FASTING PATIENT LIPID ANSWER: YES
FASTING STATUS PATIENT QL REPORTED: YES
GLOBULIN PLAS-MCNC: 3.3 G/DL (ref 2.8–4.4)
GLUCOSE BLD-MCNC: 84 MG/DL (ref 70–99)
HCT VFR BLD AUTO: 41.8 %
HDLC SERPL-MCNC: 59 MG/DL (ref 40–59)
HGB BLD-MCNC: 13.2 G/DL
IMM GRANULOCYTES # BLD AUTO: 0.02 X10(3) UL (ref 0–1)
IMM GRANULOCYTES NFR BLD: 0.4 %
LDLC SERPL CALC-MCNC: 134 MG/DL (ref ?–100)
LYMPHOCYTES # BLD AUTO: 2.16 X10(3) UL (ref 1–4)
LYMPHOCYTES NFR BLD AUTO: 39.7 %
MCH RBC QN AUTO: 28.8 PG (ref 26–34)
MCHC RBC AUTO-ENTMCNC: 31.6 G/DL (ref 31–37)
MCV RBC AUTO: 91.3 FL
MONOCYTES # BLD AUTO: 0.4 X10(3) UL (ref 0.1–1)
MONOCYTES NFR BLD AUTO: 7.4 %
NEUTROPHILS # BLD AUTO: 2.58 X10 (3) UL (ref 1.5–7.7)
NEUTROPHILS # BLD AUTO: 2.58 X10(3) UL (ref 1.5–7.7)
NEUTROPHILS NFR BLD AUTO: 47.4 %
NONHDLC SERPL-MCNC: 152 MG/DL (ref ?–130)
OSMOLALITY SERPL CALC.SUM OF ELEC: 286 MOSM/KG (ref 275–295)
PLATELET # BLD AUTO: 229 10(3)UL (ref 150–450)
POTASSIUM SERPL-SCNC: 3.7 MMOL/L (ref 3.5–5.1)
PROT SERPL-MCNC: 7.6 G/DL (ref 6.4–8.2)
RBC # BLD AUTO: 4.58 X10(6)UL
SODIUM SERPL-SCNC: 138 MMOL/L (ref 136–145)
TRIGL SERPL-MCNC: 103 MG/DL (ref 30–149)
TSI SER-ACNC: 2.73 MIU/ML (ref 0.36–3.74)
VIT D+METAB SERPL-MCNC: 34 NG/ML (ref 30–100)
VLDLC SERPL CALC-MCNC: 19 MG/DL (ref 0–30)
WBC # BLD AUTO: 5.4 X10(3) UL (ref 4–11)

## 2022-03-24 PROCEDURE — 80061 LIPID PANEL: CPT

## 2022-03-24 PROCEDURE — 3075F SYST BP GE 130 - 139MM HG: CPT | Performed by: INTERNAL MEDICINE

## 2022-03-24 PROCEDURE — 85025 COMPLETE CBC W/AUTO DIFF WBC: CPT

## 2022-03-24 PROCEDURE — 3078F DIAST BP <80 MM HG: CPT | Performed by: INTERNAL MEDICINE

## 2022-03-24 PROCEDURE — 99396 PREV VISIT EST AGE 40-64: CPT | Performed by: INTERNAL MEDICINE

## 2022-03-24 PROCEDURE — 80053 COMPREHEN METABOLIC PANEL: CPT

## 2022-03-24 PROCEDURE — 82306 VITAMIN D 25 HYDROXY: CPT

## 2022-03-24 PROCEDURE — 84443 ASSAY THYROID STIM HORMONE: CPT | Performed by: INTERNAL MEDICINE

## 2022-03-24 PROCEDURE — 36415 COLL VENOUS BLD VENIPUNCTURE: CPT

## 2022-03-24 PROCEDURE — 3008F BODY MASS INDEX DOCD: CPT | Performed by: INTERNAL MEDICINE

## 2022-03-24 RX ORDER — LEVOTHYROXINE SODIUM 88 UG/1
88 TABLET ORAL
Qty: 90 TABLET | Refills: 3 | Status: SHIPPED | OUTPATIENT
Start: 2022-03-24

## 2022-03-24 RX ORDER — LEVOTHYROXINE SODIUM 88 UG/1
88 TABLET ORAL
Qty: 30 TABLET | Refills: 0 | Status: SHIPPED | OUTPATIENT
Start: 2022-03-24 | End: 2022-03-24

## 2022-03-25 RX ORDER — LEVOTHYROXINE SODIUM 88 UG/1
TABLET ORAL
Qty: 90 TABLET | Refills: 3 | OUTPATIENT
Start: 2022-03-25

## 2022-03-31 ENCOUNTER — HOSPITAL ENCOUNTER (OUTPATIENT)
Dept: MAMMOGRAPHY | Facility: HOSPITAL | Age: 47
Discharge: HOME | End: 2022-03-31
Attending: OBSTETRICS & GYNECOLOGY
Payer: COMMERCIAL

## 2022-03-31 DIAGNOSIS — Z12.31 VISIT FOR SCREENING MAMMOGRAM: ICD-10-CM

## 2022-03-31 PROCEDURE — 77063 BREAST TOMOSYNTHESIS BI: CPT | Performed by: OBSTETRICS & GYNECOLOGY

## 2022-03-31 PROCEDURE — 77067 SCR MAMMO BI INCL CAD: CPT | Performed by: OBSTETRICS & GYNECOLOGY

## 2022-07-04 ENCOUNTER — HOSPITAL ENCOUNTER (OUTPATIENT)
Age: 47
Discharge: HOME | End: 2022-07-04
Payer: COMMERCIAL

## 2022-07-04 VITALS
HEART RATE: 68 BPM | RESPIRATION RATE: 18 BRPM | DIASTOLIC BLOOD PRESSURE: 77 MMHG | TEMPERATURE: 98 F | OXYGEN SATURATION: 100 % | SYSTOLIC BLOOD PRESSURE: 136 MMHG

## 2022-07-04 DIAGNOSIS — U07.1 COVID-19: Primary | ICD-10-CM

## 2022-07-04 LAB — SARS-COV-2 RNA RESP QL NAA+PROBE: DETECTED

## 2022-07-04 PROCEDURE — 99213 OFFICE O/P EST LOW 20 MIN: CPT

## 2022-07-15 ENCOUNTER — TELEPHONE (OUTPATIENT)
Dept: INTERNAL MEDICINE CLINIC | Facility: CLINIC | Age: 47
End: 2022-07-15

## 2022-07-15 RX ORDER — AZITHROMYCIN 250 MG/1
TABLET, FILM COATED ORAL
Qty: 6 TABLET | Refills: 0 | Status: SHIPPED | OUTPATIENT
Start: 2022-07-15 | End: 2022-07-20

## 2022-07-15 NOTE — TELEPHONE ENCOUNTER
I spoke with patient. She explained that she tested positive for Covid back on 7/4/22. She feels like she has been losing her voice since her infection. She says the voice comes and goes, feels she is straining to project over the phone. Usually worse in the morning, has a little bit of a voice during day but will lose her voice by end of the day. No pain. She feels her other symptoms have improved since her Covid infection. She asks if she needs antibiotics for her throat. She is not taking medication. She has been drinking hot tea and using Ricola cough drops. Explained that Dr. Maksim Real is out of the office until next Tuesday. Patient declines to schedule an office visit with Dr. Maksim Real at this time. She asks if there are any other recommendations from another provider today. To Dr. Bebe Perdomo, please advise. Thank you.

## 2022-07-15 NOTE — TELEPHONE ENCOUNTER
Please call patient  She tested positive for COVID around the 4th of July  Family all had, all testing negative now, patient tested negative on Sunday  Still has laryngitis, worse in the morning  Should patient be seen in the office?   Please call to discuss/advise  Tasked to nursing

## 2022-07-15 NOTE — TELEPHONE ENCOUNTER
Phone call to patient and situation discussed. Patient feels that she is recuperating well from her recent COVID infection but is having difficulty with a cough and a feeling as though she has laryngitis. When she coughs she can only bring up a small amount of sputum. She has no fever or chills. She has no sore throat. She is wondering if this is like a laryngitis for which she is taken antibiotics in the past.  At this point I felt the patient should gargle with salt water 4 times a day as necessary. I did discuss the possibility of giving her benzonatate to use for her cough but her cough does not persist that long that she needs it. I decided to give the patient a prescription for Zithromax that she can take as prescribed with 1 refill as necessary. Patient will call back if she is having more problems.

## 2023-01-27 ENCOUNTER — HOSPITAL ENCOUNTER (OUTPATIENT)
Age: 48
Discharge: HOME | End: 2023-01-27
Payer: COMMERCIAL

## 2023-01-27 ENCOUNTER — HOSPITAL ENCOUNTER (OUTPATIENT)
Dept: ULTRASOUND IMAGING | Age: 48
Discharge: HOME | End: 2023-01-27
Attending: NURSE PRACTITIONER
Payer: COMMERCIAL

## 2023-01-27 ENCOUNTER — APPOINTMENT (OUTPATIENT)
Dept: GENERAL RADIOLOGY | Age: 48
End: 2023-01-27
Attending: NURSE PRACTITIONER
Payer: COMMERCIAL

## 2023-01-27 VITALS
OXYGEN SATURATION: 98 % | TEMPERATURE: 98 F | DIASTOLIC BLOOD PRESSURE: 50 MMHG | RESPIRATION RATE: 18 BRPM | HEART RATE: 68 BPM | SYSTOLIC BLOOD PRESSURE: 120 MMHG

## 2023-01-27 DIAGNOSIS — S89.90XA INJURY OF CALF: ICD-10-CM

## 2023-01-27 DIAGNOSIS — M79.89 CALF SWELLING: Primary | ICD-10-CM

## 2023-01-27 PROCEDURE — 99214 OFFICE O/P EST MOD 30 MIN: CPT

## 2023-01-27 PROCEDURE — 73560 X-RAY EXAM OF KNEE 1 OR 2: CPT | Performed by: NURSE PRACTITIONER

## 2023-01-27 PROCEDURE — 93971 EXTREMITY STUDY: CPT | Performed by: NURSE PRACTITIONER

## 2023-01-27 NOTE — DISCHARGE INSTRUCTIONS
Rest  Ice over pants 20 minutes at a time a few times per day  Elevate extremity on 2 pillows when at rest  Tylenol 650 mg every 4 hours for pain  Ibuprofen 600 mg every 6 hours for pain, take with food  See orthopedics in 3 days

## 2023-01-27 NOTE — ED INITIAL ASSESSMENT (HPI)
Presents with 1 week of right knee and calf pain. States she was exercising last week including lunges and kneeling and developed knee pain. A couple days later she felt a \"pop\" to calf area. Now with swelling to calf. + distal CMS.

## 2023-02-03 ENCOUNTER — OFFICE VISIT (OUTPATIENT)
Dept: ORTHOPEDICS CLINIC | Facility: CLINIC | Age: 48
End: 2023-02-03
Payer: COMMERCIAL

## 2023-02-03 VITALS — HEIGHT: 66 IN | WEIGHT: 184 LBS | BODY MASS INDEX: 29.57 KG/M2

## 2023-02-03 DIAGNOSIS — S83.206A POSITIVE MCMURRAY TEST OF RIGHT KNEE, INITIAL ENCOUNTER: Primary | ICD-10-CM

## 2023-02-03 PROCEDURE — 99204 OFFICE O/P NEW MOD 45 MIN: CPT | Performed by: ORTHOPAEDIC SURGERY

## 2023-02-03 PROCEDURE — 3008F BODY MASS INDEX DOCD: CPT | Performed by: ORTHOPAEDIC SURGERY

## 2023-02-08 ENCOUNTER — OFFICE VISIT (OUTPATIENT)
Dept: ORTHOPEDICS CLINIC | Facility: CLINIC | Age: 48
End: 2023-02-08
Payer: COMMERCIAL

## 2023-02-08 DIAGNOSIS — S83.411A SPRAIN OF MEDIAL COLLATERAL LIGAMENT OF RIGHT KNEE, INITIAL ENCOUNTER: ICD-10-CM

## 2023-02-08 DIAGNOSIS — M25.461 EFFUSION OF RIGHT KNEE: ICD-10-CM

## 2023-02-08 DIAGNOSIS — M94.261 CHONDROMALACIA OF KNEE, RIGHT: Primary | ICD-10-CM

## 2023-02-08 PROCEDURE — 20610 DRAIN/INJ JOINT/BURSA W/O US: CPT | Performed by: ORTHOPAEDIC SURGERY

## 2023-02-08 PROCEDURE — 99214 OFFICE O/P EST MOD 30 MIN: CPT | Performed by: ORTHOPAEDIC SURGERY

## 2023-02-08 RX ORDER — TRIAMCINOLONE ACETONIDE 40 MG/ML
40 INJECTION, SUSPENSION INTRA-ARTICULAR; INTRAMUSCULAR ONCE
Status: COMPLETED | OUTPATIENT
Start: 2023-02-08 | End: 2023-02-08

## 2023-02-08 RX ORDER — KETOROLAC TROMETHAMINE 30 MG/ML
30 INJECTION, SOLUTION INTRAMUSCULAR; INTRAVENOUS ONCE
Status: COMPLETED | OUTPATIENT
Start: 2023-02-08 | End: 2023-02-08

## 2023-02-08 RX ADMIN — TRIAMCINOLONE ACETONIDE 40 MG: 40 INJECTION, SUSPENSION INTRA-ARTICULAR; INTRAMUSCULAR at 10:11:00

## 2023-02-08 RX ADMIN — KETOROLAC TROMETHAMINE 30 MG: 30 INJECTION, SOLUTION INTRAMUSCULAR; INTRAVENOUS at 10:11:00

## 2023-02-12 NOTE — PROCEDURES
Right Knee Intra-articular Injection    Name: Lizzette Blanco   MRN: [id number]  Date: 2/8/2023     Clinical Indications:   Chondral lesion with symptoms refractory to conservative measures. After informed consent, the injection site was marked, sterilized with topical chlorhexidine antiseptic, and locally anesthetized with skin refrigerant. The patient was situation in a comfortable position. Using sterile technique: 1 mL of 30mg/mL of Ketorolac, 2 mL of 0.5% Bupivicaine, 2 mL of 1% Lidocaine, and 1 mL of 40 mg/ml Triamcinolone was injected utilizing anterolateral approach with a 21 gauge needle. A band-aid was applied. The patient tolerated the procedure well. Disposition:   Proceed with physical therapy and return for repeat evaluation in 6 weeks. No imaging required at next visit. Nicolas Dale. Kali Coelho MD  Knee, Shoulder, & Elbow Surgery / Sports Medicine Specialist  [facility]  [place] [address], [facility], [address] [place] [address]. [email or web address]  t: [phone]  o: [phone]  f: [phone]

## 2023-04-03 ENCOUNTER — OFFICE VISIT (OUTPATIENT)
Dept: INTERNAL MEDICINE CLINIC | Facility: CLINIC | Age: 48
End: 2023-04-03

## 2023-04-03 VITALS
HEART RATE: 78 BPM | DIASTOLIC BLOOD PRESSURE: 78 MMHG | WEIGHT: 168 LBS | TEMPERATURE: 98 F | BODY MASS INDEX: 27.65 KG/M2 | HEIGHT: 65.3 IN | SYSTOLIC BLOOD PRESSURE: 126 MMHG

## 2023-04-03 DIAGNOSIS — Z12.31 ENCOUNTER FOR SCREENING MAMMOGRAM FOR MALIGNANT NEOPLASM OF BREAST: Primary | ICD-10-CM

## 2023-04-03 DIAGNOSIS — E55.9 VITAMIN D DEFICIENCY: ICD-10-CM

## 2023-04-03 DIAGNOSIS — E03.9 HYPOTHYROIDISM, UNSPECIFIED TYPE: ICD-10-CM

## 2023-04-03 DIAGNOSIS — Z78.0 POSTMENOPAUSE: ICD-10-CM

## 2023-04-03 DIAGNOSIS — Z00.00 ROUTINE HEALTH MAINTENANCE: ICD-10-CM

## 2023-04-03 DIAGNOSIS — K76.0 FATTY LIVER DISEASE, NONALCOHOLIC: ICD-10-CM

## 2023-04-03 DIAGNOSIS — J45.20 MILD INTERMITTENT ASTHMA WITHOUT COMPLICATION: ICD-10-CM

## 2023-04-03 PROBLEM — E66.9 OBESITY (BMI 30-39.9): Status: RESOLVED | Noted: 2017-06-28 | Resolved: 2023-04-03

## 2023-04-03 PROCEDURE — 99396 PREV VISIT EST AGE 40-64: CPT | Performed by: INTERNAL MEDICINE

## 2023-04-03 PROCEDURE — 3078F DIAST BP <80 MM HG: CPT | Performed by: INTERNAL MEDICINE

## 2023-04-03 PROCEDURE — 3074F SYST BP LT 130 MM HG: CPT | Performed by: INTERNAL MEDICINE

## 2023-04-03 PROCEDURE — 3008F BODY MASS INDEX DOCD: CPT | Performed by: INTERNAL MEDICINE

## 2023-04-03 RX ORDER — LEVOTHYROXINE SODIUM 88 UG/1
88 TABLET ORAL
Qty: 90 TABLET | Refills: 3 | Status: CANCELLED | OUTPATIENT
Start: 2023-04-03

## 2023-04-03 RX ORDER — PHENTERMINE HYDROCHLORIDE 37.5 MG/1
37.5 TABLET ORAL DAILY
COMMUNITY
Start: 2023-03-19

## 2023-05-03 ENCOUNTER — LAB ENCOUNTER (OUTPATIENT)
Dept: LAB | Facility: HOSPITAL | Age: 48
End: 2023-05-03
Attending: INTERNAL MEDICINE
Payer: COMMERCIAL

## 2023-05-03 DIAGNOSIS — K76.0 FATTY LIVER DISEASE, NONALCOHOLIC: ICD-10-CM

## 2023-05-03 DIAGNOSIS — E55.9 VITAMIN D DEFICIENCY: ICD-10-CM

## 2023-05-03 DIAGNOSIS — Z00.00 ROUTINE HEALTH MAINTENANCE: ICD-10-CM

## 2023-05-03 LAB
ALBUMIN SERPL-MCNC: 3.7 G/DL (ref 3.4–5)
ALBUMIN/GLOB SERPL: 0.9 {RATIO} (ref 1–2)
ALP LIVER SERPL-CCNC: 142 U/L
ALT SERPL-CCNC: 82 U/L
ANION GAP SERPL CALC-SCNC: 8 MMOL/L (ref 0–18)
AST SERPL-CCNC: 83 U/L (ref 15–37)
BASOPHILS # BLD AUTO: 0.06 X10(3) UL (ref 0–0.2)
BASOPHILS NFR BLD AUTO: 1.2 %
BILIRUB SERPL-MCNC: 0.7 MG/DL (ref 0.1–2)
BUN BLD-MCNC: 16 MG/DL (ref 7–18)
BUN/CREAT SERPL: 17.2 (ref 10–20)
CALCIUM BLD-MCNC: 9.6 MG/DL (ref 8.5–10.1)
CHLORIDE SERPL-SCNC: 102 MMOL/L (ref 98–112)
CHOLEST SERPL-MCNC: 173 MG/DL (ref ?–200)
CO2 SERPL-SCNC: 30 MMOL/L (ref 21–32)
CREAT BLD-MCNC: 0.93 MG/DL
DEPRECATED RDW RBC AUTO: 56 FL (ref 35.1–46.3)
EOSINOPHIL # BLD AUTO: 0.23 X10(3) UL (ref 0–0.7)
EOSINOPHIL NFR BLD AUTO: 4.8 %
ERYTHROCYTE [DISTWIDTH] IN BLOOD BY AUTOMATED COUNT: 16.8 % (ref 11–15)
FASTING PATIENT LIPID ANSWER: YES
FASTING STATUS PATIENT QL REPORTED: YES
GFR SERPLBLD BASED ON 1.73 SQ M-ARVRAT: 75 ML/MIN/1.73M2 (ref 60–?)
GLOBULIN PLAS-MCNC: 3.9 G/DL (ref 2.8–4.4)
GLUCOSE BLD-MCNC: 93 MG/DL (ref 70–99)
HCT VFR BLD AUTO: 42.6 %
HDLC SERPL-MCNC: 85 MG/DL (ref 40–59)
HGB BLD-MCNC: 13.5 G/DL
IMM GRANULOCYTES # BLD AUTO: 0.02 X10(3) UL (ref 0–1)
IMM GRANULOCYTES NFR BLD: 0.4 %
LDLC SERPL CALC-MCNC: 76 MG/DL (ref ?–100)
LYMPHOCYTES # BLD AUTO: 1.49 X10(3) UL (ref 1–4)
LYMPHOCYTES NFR BLD AUTO: 31 %
MCH RBC QN AUTO: 28.4 PG (ref 26–34)
MCHC RBC AUTO-ENTMCNC: 31.7 G/DL (ref 31–37)
MCV RBC AUTO: 89.5 FL
MONOCYTES # BLD AUTO: 0.37 X10(3) UL (ref 0.1–1)
MONOCYTES NFR BLD AUTO: 7.7 %
NEUTROPHILS # BLD AUTO: 2.64 X10 (3) UL (ref 1.5–7.7)
NEUTROPHILS # BLD AUTO: 2.64 X10(3) UL (ref 1.5–7.7)
NEUTROPHILS NFR BLD AUTO: 54.9 %
NONHDLC SERPL-MCNC: 88 MG/DL (ref ?–130)
OSMOLALITY SERPL CALC.SUM OF ELEC: 291 MOSM/KG (ref 275–295)
PLATELET # BLD AUTO: 222 10(3)UL (ref 150–450)
POTASSIUM SERPL-SCNC: 3.8 MMOL/L (ref 3.5–5.1)
PROT SERPL-MCNC: 7.6 G/DL (ref 6.4–8.2)
RBC # BLD AUTO: 4.76 X10(6)UL
SODIUM SERPL-SCNC: 140 MMOL/L (ref 136–145)
TRIGL SERPL-MCNC: 64 MG/DL (ref 30–149)
TSI SER-ACNC: 3.16 MIU/ML (ref 0.36–3.74)
VIT D+METAB SERPL-MCNC: 77.5 NG/ML (ref 30–100)
VLDLC SERPL CALC-MCNC: 10 MG/DL (ref 0–30)
WBC # BLD AUTO: 4.8 X10(3) UL (ref 4–11)

## 2023-05-03 PROCEDURE — 84443 ASSAY THYROID STIM HORMONE: CPT | Performed by: INTERNAL MEDICINE

## 2023-05-03 PROCEDURE — 85025 COMPLETE CBC W/AUTO DIFF WBC: CPT

## 2023-05-03 PROCEDURE — 82306 VITAMIN D 25 HYDROXY: CPT

## 2023-05-03 PROCEDURE — 36415 COLL VENOUS BLD VENIPUNCTURE: CPT

## 2023-05-03 PROCEDURE — 80061 LIPID PANEL: CPT

## 2023-05-03 PROCEDURE — 80053 COMPREHEN METABOLIC PANEL: CPT

## 2023-05-05 NOTE — TELEPHONE ENCOUNTER
Patient is calling and would like a refill for    Levothyroxine 88 MCG    Please send to the [facility]

## 2023-05-06 RX ORDER — LEVOTHYROXINE SODIUM 88 UG/1
88 TABLET ORAL
Qty: 90 TABLET | Refills: 3 | Status: SHIPPED | OUTPATIENT
Start: 2023-05-06

## 2023-05-07 ENCOUNTER — HOSPITAL ENCOUNTER (OUTPATIENT)
Dept: MAMMOGRAPHY | Facility: HOSPITAL | Age: 48
Discharge: HOME | End: 2023-05-07
Attending: INTERNAL MEDICINE
Payer: COMMERCIAL

## 2023-05-07 ENCOUNTER — HOSPITAL ENCOUNTER (OUTPATIENT)
Dept: MAMMOGRAPHY | Facility: HOSPITAL | Age: 48
End: 2023-05-07
Attending: INTERNAL MEDICINE
Payer: COMMERCIAL

## 2023-05-07 DIAGNOSIS — Z12.31 ENCOUNTER FOR SCREENING MAMMOGRAM FOR MALIGNANT NEOPLASM OF BREAST: ICD-10-CM

## 2023-05-07 PROCEDURE — 77063 BREAST TOMOSYNTHESIS BI: CPT | Performed by: INTERNAL MEDICINE

## 2023-05-07 PROCEDURE — 77067 SCR MAMMO BI INCL CAD: CPT | Performed by: INTERNAL MEDICINE

## 2023-05-16 ENCOUNTER — HOSPITAL ENCOUNTER (OUTPATIENT)
Dept: BONE DENSITY | Facility: HOSPITAL | Age: 48
Discharge: HOME | End: 2023-05-16
Attending: INTERNAL MEDICINE
Payer: COMMERCIAL

## 2023-05-16 DIAGNOSIS — Z78.0 POSTMENOPAUSE: ICD-10-CM

## 2023-05-16 PROCEDURE — 77080 DXA BONE DENSITY AXIAL: CPT | Performed by: INTERNAL MEDICINE

## 2023-05-18 ENCOUNTER — HOSPITAL ENCOUNTER (OUTPATIENT)
Age: 48
Discharge: HOME | End: 2023-05-18
Payer: COMMERCIAL

## 2023-05-18 VITALS
SYSTOLIC BLOOD PRESSURE: 129 MMHG | RESPIRATION RATE: 18 BRPM | HEART RATE: 62 BPM | DIASTOLIC BLOOD PRESSURE: 71 MMHG | OXYGEN SATURATION: 99 % | TEMPERATURE: 97 F

## 2023-05-18 DIAGNOSIS — S89.91XA RIGHT KNEE INJURY, INITIAL ENCOUNTER: Primary | ICD-10-CM

## 2023-05-18 DIAGNOSIS — M25.561 ACUTE PAIN OF RIGHT KNEE: ICD-10-CM

## 2023-05-18 PROCEDURE — 99213 OFFICE O/P EST LOW 20 MIN: CPT

## 2023-05-18 PROCEDURE — 99214 OFFICE O/P EST MOD 30 MIN: CPT

## 2023-05-18 RX ORDER — PREDNISONE 20 MG/1
40 TABLET ORAL DAILY
Qty: 10 TABLET | Refills: 0 | Status: SHIPPED | OUTPATIENT
Start: 2023-05-18 | End: 2023-05-23

## 2023-05-18 RX ORDER — CYCLOBENZAPRINE HCL 10 MG
10 TABLET ORAL 3 TIMES DAILY PRN
Qty: 16 TABLET | Refills: 0 | Status: SHIPPED | OUTPATIENT
Start: 2023-05-18 | End: 2023-05-25

## 2023-05-18 RX ORDER — TRAMADOL HYDROCHLORIDE 50 MG/1
TABLET ORAL
Qty: 10 TABLET | Refills: 0 | Status: SHIPPED | OUTPATIENT
Start: 2023-05-18

## 2023-05-18 NOTE — DISCHARGE INSTRUCTIONS
Coban was applied instead of using an ace wrap. Ace wraps have a tendency to fall down. Use crutches and avoid weight bearing as much as possible. Flexeril muscle relaxer sent to the pharmacy. This is an as needed medication so you do not have to take it unless you need it. I wou want ld avoid taking within 5 hours of tramadol to avoid sedate of side effects. Tramadol pain meds sent for night time break through pain relief. you can start with half tab if needed. If there is no conjugation of taking NSAIDs can take over-the-counter Aleve every 12 hours for up to 7 days. Avoid taking Aleve with prednisone as it can cause upset stomach. Wait at least 2 hours before, or 2 hours after. Avoid heat. Heat will make your symptoms worse. Cool compress 20 minutes on, 20 minutes off. Repeat this for 4-5 sessions at a time. Usually prednisone helps out with this type of pain, but it is just a crutch. Prednisone can also decrease your immune system to make sure that you are washing your hands regularly, and try to stay away from people with colds.

## 2023-05-18 NOTE — ED INITIAL ASSESSMENT (HPI)
Patient arrives ambulatory with c/o right knee pain that she aggravated a couple days ago with a [place] car., Has taken ibuprofen without relief. States something similar happened 3 months ago and she received a steroid shot, which improved symptoms.

## 2023-05-22 ENCOUNTER — OFFICE VISIT (OUTPATIENT)
Dept: ORTHOPEDICS CLINIC | Facility: CLINIC | Age: 48
End: 2023-05-22
Payer: COMMERCIAL

## 2023-05-22 DIAGNOSIS — M94.261 CHONDROMALACIA OF KNEE, RIGHT: Primary | ICD-10-CM

## 2023-05-22 DIAGNOSIS — S83.411A SPRAIN OF MEDIAL COLLATERAL LIGAMENT OF RIGHT KNEE, INITIAL ENCOUNTER: ICD-10-CM

## 2023-05-22 DIAGNOSIS — S83.206A POSITIVE MCMURRAY TEST OF RIGHT KNEE, INITIAL ENCOUNTER: ICD-10-CM

## 2023-05-22 PROCEDURE — 20610 DRAIN/INJ JOINT/BURSA W/O US: CPT | Performed by: ORTHOPAEDIC SURGERY

## 2023-05-22 PROCEDURE — 99214 OFFICE O/P EST MOD 30 MIN: CPT | Performed by: ORTHOPAEDIC SURGERY

## 2023-05-22 RX ORDER — TRIAMCINOLONE ACETONIDE 40 MG/ML
40 INJECTION, SUSPENSION INTRA-ARTICULAR; INTRAMUSCULAR ONCE
Status: COMPLETED | OUTPATIENT
Start: 2023-05-22 | End: 2023-05-22

## 2023-05-22 RX ORDER — KETOROLAC TROMETHAMINE 30 MG/ML
30 INJECTION, SOLUTION INTRAMUSCULAR; INTRAVENOUS ONCE
Status: COMPLETED | OUTPATIENT
Start: 2023-05-22 | End: 2023-05-22

## 2023-05-22 RX ADMIN — KETOROLAC TROMETHAMINE 30 MG: 30 INJECTION, SOLUTION INTRAMUSCULAR; INTRAVENOUS at 14:20:00

## 2023-05-22 RX ADMIN — TRIAMCINOLONE ACETONIDE 40 MG: 40 INJECTION, SUSPENSION INTRA-ARTICULAR; INTRAMUSCULAR at 14:20:00

## 2023-05-22 NOTE — PROCEDURES
Right Knee Intra-articular Injection    Name: Bell Rios   MRN: [id number]  Date: 5/22/2023     Clinical Indications:   Knee Osteoarthritis with symptoms refractory to conservative measures. After informed consent, the injection site was marked, sterilized with topical chlorhexidine antiseptic, and locally anesthetized with skin refrigerant. The patient was situation in a comfortable position. Using sterile technique: 1 mL of 30mg/mL of Ketorolac, 2 mL of 0.5% Bupivicaine, 2 mL of 1% Lidocaine, and 1 mL of 40 mg/ml Triamcinolone was injected utilizing anterolateral approach with a 22 gauge needle. A band-aid was applied. The patient tolerated the procedure well. Alex Stewart MD  Knee, Shoulder, & Elbow Surgery / Sports Medicine Specialist  [facility]  [address]   [email or web address]  t: [phone]  o: [phone]  f: [phone]

## 2024-04-15 ENCOUNTER — OFFICE VISIT (OUTPATIENT)
Dept: INTERNAL MEDICINE CLINIC | Facility: CLINIC | Age: 49
End: 2024-04-15

## 2024-04-15 VITALS
OXYGEN SATURATION: 100 % | TEMPERATURE: 99 F | DIASTOLIC BLOOD PRESSURE: 56 MMHG | WEIGHT: 153 LBS | HEART RATE: 68 BPM | SYSTOLIC BLOOD PRESSURE: 108 MMHG | RESPIRATION RATE: 16 BRPM | BODY MASS INDEX: 25.49 KG/M2 | HEIGHT: 65 IN

## 2024-04-15 DIAGNOSIS — J45.20 MILD INTERMITTENT ASTHMA WITHOUT COMPLICATION (HCC): Primary | ICD-10-CM

## 2024-04-15 DIAGNOSIS — K76.0 FATTY LIVER DISEASE, NONALCOHOLIC: ICD-10-CM

## 2024-04-15 DIAGNOSIS — Z00.00 ROUTINE HEALTH MAINTENANCE: ICD-10-CM

## 2024-04-15 DIAGNOSIS — E03.8 OTHER SPECIFIED HYPOTHYROIDISM: ICD-10-CM

## 2024-04-15 DIAGNOSIS — E55.9 VITAMIN D DEFICIENCY: ICD-10-CM

## 2024-04-15 DIAGNOSIS — Z12.31 ENCOUNTER FOR SCREENING MAMMOGRAM FOR MALIGNANT NEOPLASM OF BREAST: ICD-10-CM

## 2024-04-15 PROBLEM — N76.3 SUBACUTE VULVITIS: Status: RESOLVED | Noted: 2019-12-20 | Resolved: 2024-04-15

## 2024-04-15 PROBLEM — N95.0 POSTMENOPAUSAL BLEEDING: Status: RESOLVED | Noted: 2021-02-12 | Resolved: 2024-04-15
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
## 2024-05-21 ENCOUNTER — PATIENT MESSAGE (OUTPATIENT)
Dept: INTERNAL MEDICINE CLINIC | Facility: CLINIC | Age: 49
End: 2024-05-21

## 2024-05-21 ENCOUNTER — TELEPHONE (OUTPATIENT)
Dept: INTERNAL MEDICINE CLINIC | Facility: CLINIC | Age: 49
End: 2024-05-21

## 2024-05-21 ENCOUNTER — LAB ENCOUNTER (OUTPATIENT)
Dept: LAB | Facility: HOSPITAL | Age: 49
End: 2024-05-21
Attending: INTERNAL MEDICINE

## 2024-05-21 DIAGNOSIS — Z00.00 ROUTINE HEALTH MAINTENANCE: ICD-10-CM

## 2024-05-21 DIAGNOSIS — K76.0 FATTY LIVER DISEASE, NONALCOHOLIC: ICD-10-CM

## 2024-05-21 DIAGNOSIS — E55.9 VITAMIN D DEFICIENCY: ICD-10-CM

## 2024-05-21 LAB
ALBUMIN SERPL-MCNC: 4.8 G/DL (ref 3.2–4.8)
ALBUMIN/GLOB SERPL: 1.5 {RATIO} (ref 1–2)
ALP LIVER SERPL-CCNC: 128 U/L
ALT SERPL-CCNC: 84 U/L
ANION GAP SERPL CALC-SCNC: 10 MMOL/L (ref 0–18)
AST SERPL-CCNC: 78 U/L (ref ?–34)
BASOPHILS # BLD AUTO: 0.05 X10(3) UL (ref 0–0.2)
BASOPHILS NFR BLD AUTO: 0.8 %
BILIRUB SERPL-MCNC: 0.9 MG/DL (ref 0.3–1.2)
BUN BLD-MCNC: 18 MG/DL (ref 9–23)
BUN/CREAT SERPL: 15.5 (ref 10–20)
CALCIUM BLD-MCNC: 10.2 MG/DL (ref 8.7–10.4)
CHLORIDE SERPL-SCNC: 104 MMOL/L (ref 98–112)
CHOLEST SERPL-MCNC: 220 MG/DL (ref ?–200)
CO2 SERPL-SCNC: 29 MMOL/L (ref 21–32)
CREAT BLD-MCNC: 1.16 MG/DL
DEPRECATED RDW RBC AUTO: 49.2 FL (ref 35.1–46.3)
EGFRCR SERPLBLD CKD-EPI 2021: 57 ML/MIN/1.73M2 (ref 60–?)
EOSINOPHIL # BLD AUTO: 0.13 X10(3) UL (ref 0–0.7)
EOSINOPHIL NFR BLD AUTO: 2.1 %
ERYTHROCYTE [DISTWIDTH] IN BLOOD BY AUTOMATED COUNT: 14.4 % (ref 11–15)
FASTING PATIENT LIPID ANSWER: YES
FASTING STATUS PATIENT QL REPORTED: YES
GLOBULIN PLAS-MCNC: 3.2 G/DL (ref 2–3.5)
GLUCOSE BLD-MCNC: 86 MG/DL (ref 70–99)
HCT VFR BLD AUTO: 44.1 %
HDLC SERPL-MCNC: 100 MG/DL (ref 40–59)
HGB BLD-MCNC: 14.8 G/DL
IMM GRANULOCYTES # BLD AUTO: 0.03 X10(3) UL (ref 0–1)
IMM GRANULOCYTES NFR BLD: 0.5 %
LDLC SERPL CALC-MCNC: 108 MG/DL (ref ?–100)
LYMPHOCYTES # BLD AUTO: 1.42 X10(3) UL (ref 1–4)
LYMPHOCYTES NFR BLD AUTO: 22.8 %
MCH RBC QN AUTO: 31.2 PG (ref 26–34)
MCHC RBC AUTO-ENTMCNC: 33.6 G/DL (ref 31–37)
MCV RBC AUTO: 92.8 FL
MONOCYTES # BLD AUTO: 0.31 X10(3) UL (ref 0.1–1)
MONOCYTES NFR BLD AUTO: 5 %
NEUTROPHILS # BLD AUTO: 4.3 X10 (3) UL (ref 1.5–7.7)
NEUTROPHILS # BLD AUTO: 4.3 X10(3) UL (ref 1.5–7.7)
NEUTROPHILS NFR BLD AUTO: 68.8 %
NONHDLC SERPL-MCNC: 120 MG/DL (ref ?–130)
OSMOLALITY SERPL CALC.SUM OF ELEC: 297 MOSM/KG (ref 275–295)
PLATELET # BLD AUTO: 248 10(3)UL (ref 150–450)
POTASSIUM SERPL-SCNC: 3.9 MMOL/L (ref 3.5–5.1)
PROT SERPL-MCNC: 8 G/DL (ref 5.7–8.2)
RBC # BLD AUTO: 4.75 X10(6)UL
SODIUM SERPL-SCNC: 143 MMOL/L (ref 136–145)
TRIGL SERPL-MCNC: 68 MG/DL (ref 30–149)
TSI SER-ACNC: 2.72 MIU/ML (ref 0.55–4.78)
VIT D+METAB SERPL-MCNC: 62.5 NG/ML (ref 30–100)
VLDLC SERPL CALC-MCNC: 11 MG/DL (ref 0–30)
WBC # BLD AUTO: 6.2 X10(3) UL (ref 4–11)

## 2024-05-21 PROCEDURE — 36415 COLL VENOUS BLD VENIPUNCTURE: CPT

## 2024-05-21 PROCEDURE — 82306 VITAMIN D 25 HYDROXY: CPT

## 2024-05-21 PROCEDURE — 85025 COMPLETE CBC W/AUTO DIFF WBC: CPT

## 2024-05-21 PROCEDURE — 80061 LIPID PANEL: CPT

## 2024-05-21 PROCEDURE — 84443 ASSAY THYROID STIM HORMONE: CPT | Performed by: INTERNAL MEDICINE

## 2024-05-21 PROCEDURE — 80053 COMPREHEN METABOLIC PANEL: CPT

## 2024-05-21 RX ORDER — LEVOTHYROXINE SODIUM 88 UG/1
88 TABLET ORAL
Qty: 90 TABLET | Refills: 3 | Status: SHIPPED | OUTPATIENT
Start: 2024-05-21

## 2024-05-21 NOTE — TELEPHONE ENCOUNTER
To DR.S Deonte zuleta patient message     Refill request is for a maintenance medication and has met the criteria specified in the Ambulatory Medication Refill Standing Order for eligibility, visits, laboratory, alerts and was sent to the requested pharmacy.    Requested Prescriptions     Signed Prescriptions Disp Refills    levothyroxine 88 MCG Oral Tab 90 tablet 3     Sig: Take 1 tablet (88 mcg total) by mouth before breakfast.     Authorizing Provider: SUNIL ARIAS     Ordering User: HAMMAD MASON

## 2024-05-21 NOTE — TELEPHONE ENCOUNTER
From: Jackelin Guillermo  To: Marixa Chen  Sent: 5/21/2024 10:41 AM CDT  Subject: Test results    I will need the thyroid medicine refilled today or tomorrow, please.     But I also would like Dr. Chen to know that I exercised for two hours at my HIIT class immediately before going in for the blood draw, I have read that that results in some values not being very accurate. Just thought I would put that out there.

## 2024-05-21 NOTE — TELEPHONE ENCOUNTER
----- Message from Zeto  sent at 5/21/2024 10:41 AM CDT -----  Regarding: Test results  Contact: [phone]  I will need the thyroid medicine refilled today or tomorrow, please.     But I also would like Dr. Chen to know that I exercised for two hours at my HIIT class immediately before going in for the blood draw, I have read that that results in some values not being very accurate. Just thought I would put that out there.

## 2024-06-26 ENCOUNTER — HOSPITAL ENCOUNTER (OUTPATIENT)
Dept: MAMMOGRAPHY | Facility: HOSPITAL | Age: 49
Discharge: HOME | End: 2024-06-26
Attending: INTERNAL MEDICINE

## 2024-06-26 DIAGNOSIS — Z12.31 ENCOUNTER FOR SCREENING MAMMOGRAM FOR MALIGNANT NEOPLASM OF BREAST: ICD-10-CM

## 2024-06-26 PROCEDURE — 77067 SCR MAMMO BI INCL CAD: CPT | Performed by: INTERNAL MEDICINE

## 2024-06-26 PROCEDURE — 77063 BREAST TOMOSYNTHESIS BI: CPT | Performed by: INTERNAL MEDICINE

## 2024-07-03 ENCOUNTER — HOSPITAL ENCOUNTER (OUTPATIENT)
Dept: ULTRASOUND IMAGING | Facility: HOSPITAL | Age: 49
Discharge: HOME | End: 2024-07-03
Attending: INTERNAL MEDICINE
Payer: COMMERCIAL

## 2024-07-03 ENCOUNTER — HOSPITAL ENCOUNTER (OUTPATIENT)
Dept: MAMMOGRAPHY | Facility: HOSPITAL | Age: 49
Discharge: HOME | End: 2024-07-03
Attending: INTERNAL MEDICINE
Payer: COMMERCIAL

## 2024-07-03 DIAGNOSIS — R92.8 ABNORMAL MAMMOGRAM: ICD-10-CM

## 2024-07-03 PROCEDURE — 76642 ULTRASOUND BREAST LIMITED: CPT | Performed by: INTERNAL MEDICINE

## 2024-07-03 PROCEDURE — 77065 DX MAMMO INCL CAD UNI: CPT | Performed by: INTERNAL MEDICINE

## 2024-07-03 PROCEDURE — 77061 BREAST TOMOSYNTHESIS UNI: CPT | Performed by: INTERNAL MEDICINE

## 2024-07-15 DIAGNOSIS — R92.8 ABNORMAL MAMMOGRAM OF RIGHT BREAST: Primary | ICD-10-CM

## 2024-08-26 ENCOUNTER — OFFICE VISIT (OUTPATIENT)
Dept: OBGYN CLINIC | Facility: CLINIC | Age: 49
End: 2024-08-26
Payer: COMMERCIAL

## 2024-08-26 VITALS
WEIGHT: 159.19 LBS | DIASTOLIC BLOOD PRESSURE: 70 MMHG | BODY MASS INDEX: 26 KG/M2 | SYSTOLIC BLOOD PRESSURE: 113 MMHG | HEART RATE: 62 BPM

## 2024-08-26 DIAGNOSIS — Z12.4 SCREENING FOR CERVICAL CANCER: ICD-10-CM

## 2024-08-26 DIAGNOSIS — Z01.419 ENCOUNTER FOR GYNECOLOGICAL EXAMINATION WITHOUT ABNORMAL FINDING: Primary | ICD-10-CM

## 2024-08-26 DIAGNOSIS — N90.4 LICHEN SCLEROSUS OF VULVA: ICD-10-CM

## 2024-08-26 PROCEDURE — 87624 HPV HI-RISK TYP POOLED RSLT: CPT | Performed by: OBSTETRICS & GYNECOLOGY

## 2024-08-26 RX ORDER — CLOBETASOL PROPIONATE 0.5 MG/G
1 OINTMENT TOPICAL 2 TIMES DAILY
Qty: 30 G | Refills: 1 | Status: SHIPPED | OUTPATIENT
Start: 2024-08-26

## 2024-08-26 NOTE — PROGRESS NOTES
Jackelin Guillermo is a 49 year old female  Patient's last menstrual period was 2022. who presents for   Chief Complaint   Patient presents with    Physical     Annual   .  She has no gyne complaints.  She has a follow up mammogram scheduled for 2025.      OBSTETRICS HISTORY:  OB History    Para Term  AB Living   1 1 1 0 0 1   SAB IAB Ectopic Multiple Live Births   0 0 0 0 1       GYNE HISTORY:        MEDICAL HISTORY:  Past Medical History:    Bronchitis    hospital 7/10    Bronchospasm    severe; hospital 7/10    Fatty liver    liver bx    Hypothyroidism    Obesity    Raynaud disease    hands       SURGICAL HISTORY:  Past Surgical History:   Procedure Laterality Date          Cholecystectomy      D & c      Other      elevctive termination of pregnancy       SOCIAL HISTORY:  Social History     Socioeconomic History    Marital status:    Tobacco Use    Smoking status: Never    Smokeless tobacco: Never   Vaping Use    Vaping status: Never Used   Substance and Sexual Activity    Alcohol use: Yes     Alcohol/week: 4.0 standard drinks of alcohol     Types: 4 Glasses of wine per week     Comment: Socially    Drug use: Never   Other Topics Concern    Caffeine Concern Yes     Comment: 2 cups of coffee         FAMILY HISTORY:  Family History   Problem Relation Age of Onset    Diabetes Father     Heart Disease Father         CAD    Hypertension Father     Heart Attack Father     Obesity Father     Pancreatic Cancer Father 68    Cancer Father         Pancreatic and penile    Diabetes Mother     Hypertension Mother     Obesity Mother     Cancer Maternal Grandmother         Brain tumor    Hypertension Paternal Grandmother     Cancer Paternal Grandmother         Breast- Age Related    Breast Cancer Paternal Grandmother 89    Heart Attack Maternal Grandfather     Heart Disease Maternal Grandfather         Pacemaker    Diabetes Paternal Grandfather     Heart  Disease Paternal Grandfather        MEDICATIONS:    Current Outpatient Medications:     clobetasol 0.05 % External Ointment, Apply 1 Application topically 2 (two) times daily., Disp: 30 g, Rfl: 1    levothyroxine 88 MCG Oral Tab, Take 1 tablet (88 mcg total) by mouth before breakfast., Disp: 90 tablet, Rfl: 3    Multiple Vitamins-Minerals (WOMENS MULTI VITAMIN & MINERAL) Oral Tab, Take by mouth., Disp: , Rfl:     Cholecalciferol (VITAMIN D) 50 MCG (2000 UT) Oral Tab, Take 4,000 Units by mouth daily., Disp: 1 tablet, Rfl: 0    ALLERGIES:  No Known Allergies      Review of Systems:  Constitutional:  Denies fatigue, night sweats, hot flashes  Eyes:  denies blurred or double vision  Cardiovascular:  denies chest pain or palpitations  Respiratory:  denies shortness of breath  Gastrointestinal:  denies heartburn, abdominal pain, diarrhea or constipation  Genitourinary:  denies dysuria, incontinence, abnormal vaginal discharge, vaginal itching  Musculoskeletal:  denies back pain.  Skin/Breast:  Denies any breast pain, lumps, or discharge.   Neurological:  denies headaches, extremity weakness or numbness.  Psychiatric: denies depression or anxiety.  Endocrine:   denies excessive thirst or urination.  Heme/Lymph:  denies history of anemia, easy bruising or bleeding.    Depression Screening (PHQ-2/PHQ-9): Over the LAST 2 WEEKS   Little interest or pleasure in doing things (over the last two weeks)?: Not at all    Feeling down, depressed, or hopeless (over the last two weeks)?: Not at all    PHQ-2 SCORE: 0         Blood pressure 113/70, pulse 62, weight 159 lb 3.2 oz (72.2 kg), last menstrual period 02/05/2022, not currently breastfeeding.    PHYSICAL EXAM:   Constitutional: well developed, well nourished  Head/Face: normocephalic  Neck/Thyroid: thyroid symmetric, no thyromegaly, no nodules, no adenopathy  Lymphatic:no abnormal supraclavicular or axillary adenopathy is noted  Breast: normal without palpable masses, tenderness,  asymmetry, nipple discharge, nipple retraction or skin changes  Respiratory:  lungs clear to auscultation bilaterally  Cardiovascular: regular rate and rhythm, no significant murmur  Abdomen:  soft, nontender, nondistended, no masses  Skin/Hair: no unusual rashes or bruises  Extremities: no edema, no cyanosis  Psychiatric:  Oriented to time, place, person and situation. Appropriate mood and affect    Pelvic Exam:  External Genitalia: + lichenoid changes of labia majora similar to last exam but more thickened skin- whitish in color - advised patient that she needs to return for possible vulvar biopsy- will give her steroid cream to use bid x 2-3 weeks to see if improvement- advised patient that her risks for vulvar dysplasia/cancer increases with LSC.    Urethral Meatus:  normal in size, location, without lesions and prolapse  Bladder:  No fullness, masses or tenderness  Vagina:  Normal appearance without lesions, no abnormal discharge  Cervix:  Normal without tenderness on motion  Uterus: normal in size, contour, position, mobility, without tenderness  Adnexa: normal without masses or tenderness  Perineum: normal  Rectovaginal: no masses, normal tone  Anus: no thrombosed or ulcerated hemorroids    Assessment & Plan:   ASCCP guidelines discussed,cotest done,mammogram ordered,rtc 1 year for annual exam   SBE encouraged  Plan as above  Encounter Diagnoses   Name Primary?    Encounter for gynecological examination without abnormal finding Yes    Lichen sclerosus of vulva      No orders of the defined types were placed in this encounter.    Requested Prescriptions     Signed Prescriptions Disp Refills    clobetasol 0.05 % External Ointment 30 g 1     Sig: Apply 1 Application topically 2 (two) times daily.     None

## 2024-08-27 LAB — HPV E6+E7 MRNA CVX QL NAA+PROBE: NEGATIVE

## 2024-09-16 ENCOUNTER — TELEPHONE (OUTPATIENT)
Dept: INTERNAL MEDICINE CLINIC | Facility: CLINIC | Age: 49
End: 2024-09-16

## 2024-09-16 NOTE — TELEPHONE ENCOUNTER
Patient is calling she had some vein procedures and was informed that one of her important veins related to pelvic congestion is working less than 50%, they want to put a stent in    Requesting Dr Chen call her to discuss [phone]  She is aware Dr Cross is out of the office

## 2024-09-18 ENCOUNTER — TELEPHONE (OUTPATIENT)
Dept: INTERNAL MEDICINE CLINIC | Facility: CLINIC | Age: 49
End: 2024-09-18

## 2024-09-18 NOTE — TELEPHONE ENCOUNTER
Patient dropped off test results thant she will be discussing with the Dr at her phone visit tomorrow    09/19/24 at 5 PM    Placed in Dr Chen mail box

## 2024-09-19 ENCOUNTER — VIRTUAL PHONE E/M (OUTPATIENT)
Dept: INTERNAL MEDICINE CLINIC | Facility: CLINIC | Age: 49
End: 2024-09-19

## 2024-09-19 DIAGNOSIS — I83.893 VARICOSE VEINS OF BOTH LEGS WITH EDEMA: Primary | ICD-10-CM

## 2024-09-19 PROCEDURE — 99443 PHONE E/M BY PHYS 21-30 MIN: CPT | Performed by: INTERNAL MEDICINE

## 2024-09-19 NOTE — PROGRESS NOTES
Virtual Telephone Check-In    Jackelin Guillermo verbally consents to a Virtual/Telephone Check-In visit on 09/19/24.  Patient has been referred to the [facility] website at [email or web address] to review the yearly Consent to Treat document.    Patient understands and accepts financial responsibility for any deductible, co-insurance and/or co-pays associated with this service.    Duration of the service: 22 minutes      Summary of topics discussed:       This summer, pt started doing varicose vein procedures again (previously had procedures about 9 years ago).    Started doing the procedures in 6/2024 -- had injections, RF ablation.    Underwent scanning between procedures.  Was told that the flow in her veins was not good.    The vein doctor ([facility]) told her she had pelvic congestion, and referred pt to her partner (cardiothoracic surgeon, Dr. Peter Hebert)    Had a venogram -- iliac vein compression, possible deep vein thrombosis (?).  The CV surgeon recommended a stent in the iliac vein, but the procedure is only done in [facility]    Pt was told that there is low flow in the iliac veins (<1.0)    Dr. Hebert told her the procedure would be done under [facility] with a catheter (venogram), followed by a stent (Abre venous self-expanding stent system).  Was told she only needed one side done.    Was told this would prevent need for future varicose vein procedures.  He apparently told her that if she went to another hospital, they would probably do both sides (but he felt that she only needed one side done)    Pt is worried about the whole idea of this; something doesn't feel right to her.    A/P:    Varicose veins, b/l  -s/p injections, RF ablation  -now being advised for R iliac vein stenting  -pt is not quite sure that this is the right way to proceed  -discussed with her that a second opinion would likely be helpful  -refer to IR Dr. Clark; asked her to  bring records/scans to her appt        Marixa Chen MD

## 2024-09-26 ENCOUNTER — OFFICE VISIT (OUTPATIENT)
Dept: OBGYN CLINIC | Facility: CLINIC | Age: 49
End: 2024-09-26
Payer: COMMERCIAL

## 2024-09-26 VITALS — HEART RATE: 67 BPM | DIASTOLIC BLOOD PRESSURE: 72 MMHG | SYSTOLIC BLOOD PRESSURE: 116 MMHG

## 2024-09-26 DIAGNOSIS — N90.4 LICHEN SCLEROSUS OF VULVA: Primary | ICD-10-CM

## 2024-09-26 PROCEDURE — 99213 OFFICE O/P EST LOW 20 MIN: CPT | Performed by: OBSTETRICS & GYNECOLOGY

## 2024-09-26 PROCEDURE — 3078F DIAST BP <80 MM HG: CPT | Performed by: OBSTETRICS & GYNECOLOGY

## 2024-09-26 PROCEDURE — 3074F SYST BP LT 130 MM HG: CPT | Performed by: OBSTETRICS & GYNECOLOGY

## 2024-09-26 NOTE — PROGRESS NOTES
Jackelin Davenport Zmorzynski    3/25/1975       Chief Complaint   Patient presents with    Gyn Problem     VULVA BIOPSY   Patient is here for possible vulvar biopsy - h/o LS and had not been using her steroid cream- last exam the skin appeared to have changed from it's usual appearance.  I asked that she use the steroid cream bid x 2 weeks and then rtc- if no improvement then would do vulvar biopsy.      Past Medical History:    Bronchitis    hospital 7/10    Bronchospasm    severe; hospital 7/10    Fatty liver    liver bx    Hypothyroidism    Obesity    Raynaud disease    hands       Past Surgical History:   Procedure Laterality Date          Cholecystectomy      D & c      Other      elevctive termination of pregnancy        Hx Prior Abnormal Pap: No  Pap Date: 24  Pap Result Notes: pap/hpv neg...mammo 24 HYACINTH BENIGN , DEXA-2023 Normal      Current Outpatient Medications on File Prior to Visit   Medication Sig Dispense Refill    clobetasol 0.05 % External Ointment Apply 1 Application topically 2 (two) times daily. 30 g 1    levothyroxine 88 MCG Oral Tab Take 1 tablet (88 mcg total) by mouth before breakfast. 90 tablet 3    Multiple Vitamins-Minerals (WOMENS MULTI VITAMIN & MINERAL) Oral Tab Take by mouth.      Cholecalciferol (VITAMIN D) 50 MCG (2000 UT) Oral Tab Take 4,000 Units by mouth daily. 1 tablet 0     No current facility-administered medications on file prior to visit.         PHYSICAL EXAM:       Constitutional: well developed, well nourished  Head/Face: normocephalic  Psychiatric:  Oriented to time, place, person and situation. Appropriate mood and affect    Pelvic Exam:  External Genitalia: +lichenoid changes but the thickening of the slin has resolved and has it's typical lichenoid changes - discussed with patient- no biopsy needed.    Urethral Meatus:  normal in size, location, without lesions and prolapse  Perineum: normal  Anus: no hemorhroids      Jackelin was  seen today for gyn problem.    Diagnoses and all orders for this visit:    Lichen sclerosus of vulva    Natural moisturizers daily such as pure coconut or vegetable oil, steroid cream prn or twice weekly and bid with flareup of symptoms.  Encouraged patient to do visual inspection for herself once monthly and to notify me of any changes.

## 2024-11-27 ENCOUNTER — TELEPHONE (OUTPATIENT)
Dept: INTERNAL MEDICINE CLINIC | Facility: CLINIC | Age: 49
End: 2024-11-27

## 2024-11-27 NOTE — TELEPHONE ENCOUNTER
Called patient and relayed  message - she states she was only told the test can be false positive . Information for DR. Nolan given

## 2024-11-27 NOTE — TELEPHONE ENCOUNTER
Patient donates platelets. 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

## 2024-11-27 NOTE — TELEPHONE ENCOUNTER
Well I have no clue. This is not my area at all.  Sounds like they need to repeat another confirmatory test.  What did they tell her?   If they can't do it, then recommend seeing hematology Dr. Fernando or Dr. Nolan

## 2025-01-16 ENCOUNTER — HOSPITAL ENCOUNTER (OUTPATIENT)
Dept: MAMMOGRAPHY | Facility: HOSPITAL | Age: 50
Discharge: HOME | End: 2025-01-16
Attending: INTERNAL MEDICINE
Payer: COMMERCIAL

## 2025-01-16 DIAGNOSIS — R92.8 ABNORMAL MAMMOGRAM OF RIGHT BREAST: ICD-10-CM

## 2025-01-16 PROCEDURE — 77061 BREAST TOMOSYNTHESIS UNI: CPT | Performed by: INTERNAL MEDICINE

## 2025-01-16 PROCEDURE — 77065 DX MAMMO INCL CAD UNI: CPT | Performed by: INTERNAL MEDICINE

## 2025-01-21 DIAGNOSIS — Z12.31 ENCOUNTER FOR SCREENING MAMMOGRAM FOR MALIGNANT NEOPLASM OF BREAST: Primary | ICD-10-CM

## 2025-02-07 ENCOUNTER — TELEPHONE (OUTPATIENT)
Dept: INTERNAL MEDICINE CLINIC | Facility: CLINIC | Age: 50
End: 2025-02-07

## 2025-02-07 ENCOUNTER — TELEPHONE (OUTPATIENT)
Age: 50
End: 2025-02-07

## 2025-02-07 NOTE — TELEPHONE ENCOUNTER
Patient was referred to Dr Nolan for a consult. Gives blood somewhere and her labs are abnormal.  I asked for lab results and she said she cannot get them.  Referred by Dr Chen.

## 2025-02-07 NOTE — TELEPHONE ENCOUNTER
Pt. Asking to speak with Dr. Chen.  This is regarding from [facility].  This is another occurrence and a 2nd letter that she received.

## 2025-02-07 NOTE — TELEPHONE ENCOUNTER
Jennifer from Dr. Humble Nolan's office at [facility] called to obtain the below items so she can schedule patient with Dr. Fernando.     Labs from [facility] - not seeing in Epic  Need a diagnosis for appointment.     Please call Jennifer at [phone]  Fax#[phone]

## 2025-02-07 NOTE — TELEPHONE ENCOUNTER
Please advise - called patient who states she was referred to DR. Marty Nolan and when she called she was told \" we cannot help you \"we need a diagnosis - to

## 2025-02-12 ENCOUNTER — OFFICE VISIT (OUTPATIENT)
Age: 50
End: 2025-02-12
Attending: INTERNAL MEDICINE
Payer: COMMERCIAL

## 2025-02-12 ENCOUNTER — LAB ENCOUNTER (OUTPATIENT)
Dept: LAB | Facility: HOSPITAL | Age: 50
End: 2025-02-12
Attending: INTERNAL MEDICINE
Payer: COMMERCIAL

## 2025-02-12 VITALS
RESPIRATION RATE: 16 BRPM | SYSTOLIC BLOOD PRESSURE: 127 MMHG | BODY MASS INDEX: 26.52 KG/M2 | DIASTOLIC BLOOD PRESSURE: 75 MMHG | HEIGHT: 66 IN | WEIGHT: 165 LBS | TEMPERATURE: 96 F | HEART RATE: 63 BPM | OXYGEN SATURATION: 100 %

## 2025-02-12 DIAGNOSIS — R89.9 ABNORMAL LABORATORY TEST: Primary | ICD-10-CM

## 2025-02-12 DIAGNOSIS — R89.9 ABNORMAL LABORATORY TEST: ICD-10-CM

## 2025-02-12 LAB
ALBUMIN SERPL-MCNC: 4.6 G/DL (ref 3.2–4.8)
ALBUMIN/GLOB SERPL: 1.6 {RATIO} (ref 1–2)
ALP LIVER SERPL-CCNC: 121 U/L
ALT SERPL-CCNC: 64 U/L
ANION GAP SERPL CALC-SCNC: 7 MMOL/L (ref 0–18)
AST SERPL-CCNC: 72 U/L (ref ?–34)
BASOPHILS # BLD AUTO: 0.05 X10(3) UL (ref 0–0.2)
BASOPHILS NFR BLD AUTO: 1.1 %
BILIRUB SERPL-MCNC: 0.8 MG/DL (ref 0.3–1.2)
BUN BLD-MCNC: 18 MG/DL (ref 9–23)
BUN/CREAT SERPL: 19.6 (ref 10–20)
CALCIUM BLD-MCNC: 9.5 MG/DL (ref 8.7–10.4)
CHLORIDE SERPL-SCNC: 102 MMOL/L (ref 98–112)
CO2 SERPL-SCNC: 30 MMOL/L (ref 21–32)
CREAT BLD-MCNC: 0.92 MG/DL
DEPRECATED RDW RBC AUTO: 45.9 FL (ref 35.1–46.3)
EGFRCR SERPLBLD CKD-EPI 2021: 75 ML/MIN/1.73M2 (ref 60–?)
EOSINOPHIL # BLD AUTO: 0.18 X10(3) UL (ref 0–0.7)
EOSINOPHIL NFR BLD AUTO: 3.9 %
ERYTHROCYTE [DISTWIDTH] IN BLOOD BY AUTOMATED COUNT: 12.9 % (ref 11–15)
FASTING STATUS PATIENT QL REPORTED: YES
GLOBULIN PLAS-MCNC: 2.8 G/DL (ref 2–3.5)
GLUCOSE BLD-MCNC: 97 MG/DL (ref 70–99)
HCT VFR BLD AUTO: 41 %
HGB BLD-MCNC: 13.8 G/DL
IMM GRANULOCYTES # BLD AUTO: 0.02 X10(3) UL (ref 0–1)
IMM GRANULOCYTES NFR BLD: 0.4 %
LYMPHOCYTES # BLD AUTO: 1.38 X10(3) UL (ref 1–4)
LYMPHOCYTES NFR BLD AUTO: 30.3 %
MCH RBC QN AUTO: 32.4 PG (ref 26–34)
MCHC RBC AUTO-ENTMCNC: 33.7 G/DL (ref 31–37)
MCV RBC AUTO: 96.2 FL
MONOCYTES # BLD AUTO: 0.33 X10(3) UL (ref 0.1–1)
MONOCYTES NFR BLD AUTO: 7.2 %
NEUTROPHILS # BLD AUTO: 2.6 X10 (3) UL (ref 1.5–7.7)
NEUTROPHILS # BLD AUTO: 2.6 X10(3) UL (ref 1.5–7.7)
NEUTROPHILS NFR BLD AUTO: 57.1 %
OSMOLALITY SERPL CALC.SUM OF ELEC: 290 MOSM/KG (ref 275–295)
PLATELET # BLD AUTO: 194 10(3)UL (ref 150–450)
POTASSIUM SERPL-SCNC: 5 MMOL/L (ref 3.5–5.1)
PROT SERPL-MCNC: 7.4 G/DL (ref 5.7–8.2)
RBC # BLD AUTO: 4.26 X10(6)UL
SODIUM SERPL-SCNC: 139 MMOL/L (ref 136–145)
WBC # BLD AUTO: 4.6 X10(3) UL (ref 4–11)

## 2025-02-12 PROCEDURE — 88185 FLOWCYTOMETRY/TC ADD-ON: CPT

## 2025-02-12 PROCEDURE — 80053 COMPREHEN METABOLIC PANEL: CPT

## 2025-02-12 PROCEDURE — 85025 COMPLETE CBC W/AUTO DIFF WBC: CPT

## 2025-02-12 PROCEDURE — 88189 FLOWCYTOMETRY/READ 16 & >: CPT

## 2025-02-12 PROCEDURE — 88184 FLOWCYTOMETRY/ TC 1 MARKER: CPT

## 2025-02-12 PROCEDURE — 36415 COLL VENOUS BLD VENIPUNCTURE: CPT

## 2025-02-12 NOTE — PROGRESS NOTES
Hematology Consultation Note    Patient Name: Jackelin Guillermo   YOB: 1975   Medical Record Number: [id number]   CSN: [id number]   Consulting Physician: Marty Nolan MD  Referring Physician(s): Marixa Chen  Date of Consultation: 2025     Reason for Consultation:  Abnormal labs    History of Present Illness:     Jackelin Guillermo is a 49 year old female that was seen today in the Hematology suite for evaluation of the above condition. Pt has PMH relevant for Raynaud's phenomenon previously evaluated by rheumatology with last visit in  with Dr. Mary Alice Rosales and hx of NAFLD.    Patient is being evaluated based on two lab tests evaluations from [facility] in 2024 and 2025 when attempting to donate platelets, she was refused based on screening tests which were positive for HTLV-I/II infection. However, their HTLV-I/II antibody confirmatory test were reported unable to be completed due to a \"technical problem\".  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 Patient reports being in her usual state of health over the last 6 to 12 months.  Patient denies systemic signs of illness, no reports of skin change, bone pain, back pain, with no reports of blood loss as she is postmenopausal. Jackelin reports exercising regularly with high intensity training. Her ROS is negative for any other concerns.    Past Medical History:  Past Medical History:    Bronchitis    hospital 7/10    Bronchospasm    severe; hospital 7/10    Fatty liver    liver bx    Hypothyroidism    Obesity    Raynaud disease    hands       Past Surgical History:  Past Surgical History:   Procedure Laterality Date      2007    Cholecystectomy  2010    D & c  1999    Other  1993    elevctive termination of pregnancy       Family Medical History:  Family History   Problem Relation Age of Onset    Diabetes Mother     Hypertension Mother     Obesity Mother     Diabetes Father     Heart Disease Father          CAD    Hypertension Father     Heart Attack Father     Obesity Father     Pancreatic Cancer Father 68    Cancer Father         penile    Cancer Maternal Grandmother         Brain tumor    Heart Attack Maternal Grandfather     Heart Disease Maternal Grandfather         Pacemaker    Hypertension Paternal Grandmother     Breast Cancer Paternal Grandmother 89    Diabetes Paternal Grandfather     Heart Disease Paternal Grandfather     Bleeding Disorders Neg     DVT/VTE Neg        Social History:  Social History     Socioeconomic History    Marital status:      Spouse name: Not on file    Number of children: Not on file    Years of education: Not on file    Highest education level: Not on file   Occupational History    Not on file   Tobacco Use    Smoking status: Never    Smokeless tobacco: Never   Vaping Use    Vaping status: Never Used   Substance and Sexual Activity    Alcohol use: Yes     Alcohol/week: 4.0 standard drinks of alcohol     Types: 4 Glasses of wine per week     Comment: Socially    Drug use: Never    Sexual activity: Not on file   Other Topics Concern     Service Not Asked    Blood Transfusions Not Asked    Caffeine Concern Yes     Comment: 2 cups of coffee    Occupational Exposure Not Asked    Hobby Hazards Not Asked    Sleep Concern Not Asked    Stress Concern Not Asked    Weight Concern Not Asked    Special Diet Not Asked    Back Care Not Asked    Exercise Not Asked    Bike Helmet Not Asked    Seat Belt Not Asked    Self-Exams Not Asked   Social History Narrative    Jackelin is . She has a daughter. Patient is not working; typically works as a . She lives with her  and daughter in [place], [place]     Social Drivers of Health     Food Insecurity: Not on file   Transportation Needs: Not on file   Housing Stability: Not on file       Allergies:   Allergies[1]    Current Medications:   clobetasol 0.05 % External Ointment Apply 1 Application topically 2 (two) times  daily. 30 g 1    levothyroxine 88 MCG Oral Tab Take 1 tablet (88 mcg total) by mouth before breakfast. 90 tablet 3    Multiple Vitamins-Minerals (WOMENS MULTI VITAMIN & MINERAL) Oral Tab Take by mouth.      Cholecalciferol (VITAMIN D) 50 MCG (2000 UT) Oral Tab Take 4,000 Units by mouth daily. 1 tablet 0       Review of Systems:    Constitutional: Negative for anorexia, chills, fatigue, fevers, night sweats and weight loss.  Eyes: Negative for visual disturbance, irritation and redness.  Respiratory: Negative for cough, hemoptysis, chest pain, or dyspnea on exertion.  Gastrointestinal: Negative for dysphagia, odynophagia, reflux symptoms, nausea, vomiting, change in bowel habits, diarrhea, constipation and abdominal pain.  Integument/breast: Negative for rash, skin lesions, and pruritus.  Hematologic/lymphatic: Negative for easy bruising, bleeding, and lymphadenopathy.  Musculoskeletal: Negative for myalgias, arthralgias, muscle weakness.  Neurological: Negative for headaches, dizziness, speech problems, gait problems and weakness.    A comprehensive 14 point review of systems was completed.  Pertinent positives and negatives noted in the the HPI.     Vital Signs:  /75 (BP Location: Left arm, Patient Position: Sitting, Cuff Size: adult)   Pulse 63   Temp (!) 95.6 °F (35.3 °C) (Tympanic)   Resp 16   Ht 1.676 m (5' 6\")   Wt 74.8 kg (165 lb)   LMP 02/05/2022   SpO2 100%   BMI 26.63 kg/m²     Physical Examination:    General: Patient is alert and oriented x 3, not in acute distress.  Psych: Friendly, cooperative with appropriate questions and responses  HEENT: EOMs intact. Oropharynx is clear.   Neck: No palpable lymphadenopathy. Neck is supple.  Lymphatics: There is no palpable lymphadenopathy throughout in the cervical, supraclavicular, axillary regions.  Chest: Clear to auscultation. No wheezes or rales.  Heart: Regular rate and rhythm. S1S2 normal.  Abdomen: Soft, non tender with good bowel sounds.  No  hepatosplenomegaly.  No palpable mass.  Extremities: No edema or calf tenderness.  Neurological: Grossly intact.   Skin: no abnormal skin lesions in arms, shoulders, back, legs; has a left lower ankle birth jaleesa present     Labs:    Lab Results   Component Value Date/Time    WBC 4.6 02/12/2025 10:38 AM    RBC 4.26 02/12/2025 10:38 AM    HGB 13.8 02/12/2025 10:38 AM    HCT 41.0 02/12/2025 10:38 AM    MCV 96.2 02/12/2025 10:38 AM    MCH 32.4 02/12/2025 10:38 AM    MCHC 33.7 02/12/2025 10:38 AM    RDW 12.9 02/12/2025 10:38 AM    NEPRELIM 2.60 02/12/2025 10:38 AM    .0 02/12/2025 10:38 AM       Lab Results   Component Value Date/Time    GLU 86 05/21/2024 07:39 AM    BUN 18 05/21/2024 07:39 AM    CREATSERUM 1.16 (H) 05/21/2024 07:39 AM    GFRNAA 56 (L) 03/24/2022 03:34 PM    CA 10.2 05/21/2024 07:39 AM    ALB 4.8 05/21/2024 07:39 AM     05/21/2024 07:39 AM    K 3.9 05/21/2024 07:39 AM     05/21/2024 07:39 AM    CO2 29.0 05/21/2024 07:39 AM    ALKPHO 128 (H) 05/21/2024 07:39 AM    AST 78 (H) 05/21/2024 07:39 AM    ALT 84 (H) 05/21/2024 07:39 AM         Impression:      ICD-10-CM    1. Abnormal laboratory test  R89.9 CBC With Differential With Platelet     Comp Metabolic Panel (14)     Leukemia lymphoma flow blood     CANCELED: LDH        49 year old  W with PMH relevant for relevant for Raynaud's phenomenon presents for an evaluation of a possible false positive lab test regarding the the HTLV-I/II infection     Plan:    1.) Abnormal Lab Test    --this pt has undergone two screening tests at [facility] in 11/2024 and 1/5/2025 which were positive for HTLV-I/II infection  --however, the their HTLV-I/II antibody confirmatory test were reported unable to be completed due to a \"technical problem\"  --discussed with pt that my first recommendation would be to see ID to undergo the appropriate confirmatory test to see if this is an accurate result or false positive; provided her with the name of Dr. Thomas in  ID  --we reviewed that HTLV-I can be associated with T-cell leukemia/lymphoma.  This patient has no systemic signs of illness, no skin manifestations to suggest cutaneous T-cell lymphoma presents.  Patient has no bone pain or back pain to suggest skeletal disease burden.  Considering we do not have a confirmatory test that she has this infection, rec her to undergo CBC+, CMP as well as leukemia lymphoma flow cytometry; results can be reviewed via MyChart  --HTLV-1 infections can be associated with ITP, but her plts are totally normal, so that is not present  --I have suggested that if she has a confirmed infection found by ID, then she may RTC in the future as I may consider CT scan imaging at that time. But, this maybe a false positive result at this time    2.) Elevated liver enzymes    --chronic; likely related to known NAFLD; should improve with diet and exercise, f/u with your PCP    MDM: Moderate Risk     Marty Nolan MD  [facility]  [address] [facility] [place]  [address]  [phone]             [1] No Known Allergies

## 2025-02-14 LAB
CD10 CELLS NFR SPEC: <1 %
CD10/CD19: <1 %
CD19 CELLS NFR SPEC: 7 %
CD19+/CD200+: 4 %
CD2 CELLS NFR SPEC: 92 %
CD20 CELLS NFR SPEC: 6 %
CD200 CELLS: 5 %
CD3 CELLS NFR SPEC: 77 %
CD3+/TCRGD+: <1 %
CD3+CD4+ CELLS NFR SPEC: 60 %
CD3+CD4+ CELLS/CD3+CD8+ CLL SPEC: 3.5
CD3+CD8+ CELLS NFR SPEC: 17 %
CD3-/CD56+: 14 %
CD33 CELLS NFR SPEC: 28 %
CD33+/CD34+ MDS CELS: 2 %
CD34 CELLS NFR SPEC: 2 %
CD34 CELLS NFR SPEC: <1 %
CD38 CELLS NFR SPEC: 4 %
CD38+/CD19+: <1 %
CD45 CELLS NFR SPEC: 100 %
CD5 CELLS NFR SPEC: 77 %
CD5/CD19 CELLS: 1 %
CD56 CELLS NFR SPEC: 14 %
CD7 CELLS NFR SPEC: 87 %
CELL SURF KAPPA/LAMBDA RATIO: 1
CELL SURF LAMBDA LIGHT CHAIN: 3 %
CELL SURFACE KAPPA LIGHT CHAIN: 3 %
TCR G-D CELLS NFR SPEC: <1 %

## 2025-03-10 RX ORDER — LEVOTHYROXINE SODIUM 88 UG/1
88 TABLET ORAL
Qty: 90 TABLET | Refills: 3 | OUTPATIENT
Start: 2025-03-10

## 2025-03-10 NOTE — TELEPHONE ENCOUNTER
Current refill request refused due to refill is either a duplicate request or has active refills at the pharmacy.  Check previous templates.    Requested Prescriptions     Refused Prescriptions Disp Refills    LEVOTHYROXINE 88 MCG Oral Tab [Pharmacy Med Name: LEVOTHYROXINE 0.088MG (88MCG) TAB] 90 tablet 3     Sig: TAKE 1 TABLET(88 MCG) BY MOUTH BEFORE BREAKFAST     Refused By: ROBERT PAINTING     Reason for Refusal: Patient has requested refill too soon      sent 5/21/2024 with enough refills for one year, too early

## 2025-03-17 ENCOUNTER — TELEPHONE (OUTPATIENT)
Facility: CLINIC | Age: 50
End: 2025-03-17

## 2025-03-17 DIAGNOSIS — M25.512 LEFT SHOULDER PAIN, UNSPECIFIED CHRONICITY: Primary | ICD-10-CM

## 2025-03-17 NOTE — TELEPHONE ENCOUNTER
Patient scheduled online for left shoulder pain. Please advise if imaging is needed.   Future Appointments   Date Time Provider Department Center   3/31/2025  8:30 AM [id number]   4/21/2025  9:00 AM Marixa Chen MD [facility]   7/26/2025  7:40 AM [facility]

## 2025-03-31 ENCOUNTER — OFFICE VISIT (OUTPATIENT)
Dept: ORTHOPEDICS CLINIC | Facility: CLINIC | Age: 50
End: 2025-03-31
Payer: COMMERCIAL

## 2025-03-31 ENCOUNTER — TELEPHONE (OUTPATIENT)
Dept: ORTHOPEDICS CLINIC | Facility: CLINIC | Age: 50
End: 2025-03-31

## 2025-03-31 ENCOUNTER — HOSPITAL ENCOUNTER (OUTPATIENT)
Dept: GENERAL RADIOLOGY | Age: 50
Discharge: HOME | End: 2025-03-31
Attending: ORTHOPAEDIC SURGERY
Payer: COMMERCIAL

## 2025-03-31 VITALS — BODY MASS INDEX: 24.98 KG/M2 | HEIGHT: 67.2 IN | WEIGHT: 161 LBS

## 2025-03-31 DIAGNOSIS — M75.02 ADHESIVE CAPSULITIS OF LEFT SHOULDER: ICD-10-CM

## 2025-03-31 DIAGNOSIS — M75.81 TENDINITIS OF RIGHT ROTATOR CUFF: ICD-10-CM

## 2025-03-31 DIAGNOSIS — M75.82 TENDINITIS OF LEFT ROTATOR CUFF: Primary | ICD-10-CM

## 2025-03-31 DIAGNOSIS — M25.512 LEFT SHOULDER PAIN, UNSPECIFIED CHRONICITY: ICD-10-CM

## 2025-03-31 PROCEDURE — 99214 OFFICE O/P EST MOD 30 MIN: CPT | Performed by: ORTHOPAEDIC SURGERY

## 2025-03-31 PROCEDURE — 73030 X-RAY EXAM OF SHOULDER: CPT | Performed by: ORTHOPAEDIC SURGERY

## 2025-03-31 PROCEDURE — 3008F BODY MASS INDEX DOCD: CPT | Performed by: ORTHOPAEDIC SURGERY

## 2025-03-31 NOTE — H&P
Orthopaedic Surgery  [address] [place] [address]  [phone]     HISTORY AND PHYSICAL EXAMINATION     Name: Jackelin Guillermo   MRN: [id number]  Date: 3/31/2025     CC: Left shoulder pain    REFERRED BY: Marixa Chen MD    HPI:   Jackelin Guillermo is a very pleasant 50 year old right-hand dominant female who presents today for evaluation of left shoulder pain ongoing for a few months after heavy weight lifting. She notes a popping and cracking noise in the shoulder. Pain is rated up to 8/10. This improves with rest and worsens with lifting. She has tried oral anti-inflammatory medication and physical therapy for a few months without improvement.     She is well known to our team for previous treatment of right knee chondromalacia with corticosteroid injections x 2.     She lives with her spouse and child. Enjoys working out.     PMH:   Past Medical History:    Bronchitis    hospital 7/10    Bronchospasm    severe; hospital 7/10    Fatty liver    liver bx    Hypothyroidism    Obesity    Raynaud disease    hands       PAST SURGICAL HX:  Past Surgical History:   Procedure Laterality Date          Cholecystectomy      D & c      Other  1993    elevctive termination of pregnancy       FAMILY HX:  Family History   Problem Relation Age of Onset    Diabetes Mother     Hypertension Mother     Obesity Mother     Diabetes Father     Heart Disease Father         CAD    Hypertension Father     Heart Attack Father     Obesity Father     Pancreatic Cancer Father 68    Cancer Father         penile    Cancer Maternal Grandmother         Brain tumor    Heart Attack Maternal Grandfather     Heart Disease Maternal Grandfather         Pacemaker    Hypertension Paternal Grandmother     Breast Cancer Paternal Grandmother 89    Diabetes Paternal Grandfather     Heart Disease Paternal Grandfather     Bleeding Disorders Neg     DVT/VTE Neg        ALLERGIES:  Patient has no known  allergies.    MEDICATIONS:   Current Outpatient Medications   Medication Sig Dispense Refill    clobetasol 0.05 % External Ointment Apply 1 Application topically 2 (two) times daily. 30 g 1    levothyroxine 88 MCG Oral Tab Take 1 tablet (88 mcg total) by mouth before breakfast. 90 tablet 3    Multiple Vitamins-Minerals (WOMENS MULTI VITAMIN & MINERAL) Oral Tab Take by mouth.      Cholecalciferol (VITAMIN D) 50 MCG (2000 UT) Oral Tab Take 4,000 Units by mouth daily. 1 tablet 0       ROS: A comprehensive 14 point review of systems was performed and was negative aside from the aforementioned per history of present illness.    SOCIAL HX:  Social History     Tobacco Use    Smoking status: Never    Smokeless tobacco: Never   Substance Use Topics    Alcohol use: Yes     Alcohol/week: 4.0 standard drinks of alcohol     Types: 4 Glasses of wine per week     Comment: Socially       PE:   Vitals:    03/31/25 0838   Weight: 161 lb   Height: 5' 7.2\" (1.707 m)     Estimated body mass index is 25.07 kg/m² as calculated from the following:    Height as of this encounter: 5' 7.2\" (1.707 m).    Weight as of this encounter: 161 lb.    Physical Exam  Constitutional:       Appearance: Normal appearance.   HENT:      Head: Normocephalic and atraumatic.   Eyes:      Extraocular Movements: Extraocular movements intact.   Neck:      Musculoskeletal: Normal range of motion and neck supple.   Cardiovascular:      Pulses: Normal pulses.   Pulmonary:      Effort: Pulmonary effort is normal. No respiratory distress.   Abdominal:      General: There is no distension.   Skin:     General: Skin is warm.      Capillary Refill: Capillary refill takes less than 2 seconds.      Findings: No bruising.   Neurological:      General: No focal deficit present.      Mental Status: Alert.   Psychiatric:         Mood and Affect: Mood normal.     Examination of the shoulders demonstrates:   Skin is intact, warm and dry.   Cervical:  Full  ROM  Spurling's  Negative    Deformity:   none  Atrophy:   none    Scapular winging: Negative    Palpation:     AC Joint   Negative  Biceps Tendon  Negative  Greater Tuberosity Negative    ROM:   Forward Flexion:  Left- lacking 5 degrees  Abduction:   Left- lacking 5 degrees  External Rotation:  Left- lacking 5 degrees  Internal Rotation:  Left- lacking 5 degrees    Rotator Cuff Strength:   Supraspinatus:   Right- 4+/5  Subscapularis:   5/5  Infraspinatus/Teres: 5/5    Provocative Tests:   Koch:   Negative  Speed's:   Negative  Bryant's:   Negative  Lift-off:    Negative  Apprehension:  Negative  Sulcus Sign:   Negative    Neurovascular Upper Extremity (Bilateral)  Motor:    5/5 EPL, Finger Abduction, , Pinch, Deltoid  Sensation:   intact to light touch median, ulnar, radial and axillary nerve  Circulation:   Normal, 2+ radial pulse    The contralateral upper extremity is without limitation in range of motion or strength, no positive provocative maneuvers.       Radiographic Examination/Diagnostics:    Shoulder XR personally viewed, independently interpreted and radiology report was reviewed.    Radiographs demonstrate no evidence of osteoarthritis with well-maintained joint space.  No fractures or dislocations.       IMPRESSION: Jackelin Guillermo is a 50 year old Right hand dominant female with left adhesive capsulitis and right rotator cuff injury sustained a few months ago after lifting weights. Physical therapy has provided limited relief.     We elected to maximize conservative management with a PRP injection.    PLAN:   We had a detailed discussion outlining the etiology, anatomy, pathophysiology, and natural history of patient's findings. Imaging was reviewed in detail and correlated to a 3-dimensional model of the shoulder.     We provided education, and discussed at great length the use of OrthoBiologics, specifically, Platelet Rich Plasma (PRP). We discussed the growing evidence for the  efficacy of PRP injections with regard to the patient's specific findings, as well as the promotion of healing for muscle, tendon, and joint injuries.     We discussed the scientific rationale for this procedure which is that the plasma contains platelets which release growth factors that induce a healing response wherever they are applied. We also discussed the benefits, risks, and limitations. The patient understands that there is a slightly higher level of complexity to this procedure compared to other injections such as cortisone, or viscosupplementation.     We also discussed the benefits of PRP in comparison to surgery, specifically including, but not limited to: less invasive than an open surgical procedure for the same condition, possible shorter recovery time, significantly more cost effective.        FOLLOW-UP:   She will return for PRP injection.      Nanette Armstrong MD  Knee, Shoulder, & Elbow Surgery / Sports Medicine Specialist  Orthopaedic Surgery  [address] [place] [address].[email or web address]  [email or web address]  t: [phone]  o: [phone]  f: [phone]    This note was dictated using Dragon software.  While it was briefly proofread prior to completion, some grammatical, spelling, and word choice errors due to dictation may still occur.

## 2025-03-31 NOTE — TELEPHONE ENCOUNTER
Hi     Just wanted to send a reminder for your PRP procedure with Dr Armstrong / Sincer on       As we have previously discussed please remember:    1. $885.00 will be due at the time of check in    2. Have a good meal and plenty of fluids 2 DAYS BEFORE YOUR APPOINTMENT prior to your appointment    3. Plan on being in the office for 2hrs for the entire procedure including waiting time.     4. Do not take any NSAIDS (Ibuprofin, Aspirin, Naproxen, Aleve, Motrin, Advil) or Anticoagulants one day before your appointment/ effective today    5. Do not take any NSAIDS for 2 months after procedure (Extra Strength Tylenol is safe to use for pain relief)    6. Someone may attend the appointment with you.    Injection may cause mild to moderate pain and intense pressure sensations in and around the site. Peak discomfort occurs around 5 minutes post-injection, gradually diminishing over 10-15 minutes. Pressure is due to injected fluid volume, creating an abnormal sensation that subsides as fluid spreads. Pain levels vary (3 to 8 out of 10) and tolerance is patient-dependent.     Thank you,    Chelsea.

## 2025-04-04 ENCOUNTER — OFFICE VISIT (OUTPATIENT)
Dept: ORTHOPEDICS CLINIC | Facility: CLINIC | Age: 50
End: 2025-04-04
Payer: COMMERCIAL

## 2025-04-04 DIAGNOSIS — M75.02 ADHESIVE CAPSULITIS OF LEFT SHOULDER: ICD-10-CM

## 2025-04-04 DIAGNOSIS — S83.512A RUPTURE OF ANTERIOR CRUCIATE LIGAMENT OF LEFT KNEE, INITIAL ENCOUNTER: ICD-10-CM

## 2025-04-04 DIAGNOSIS — M75.82 TENDINITIS OF LEFT ROTATOR CUFF: Primary | ICD-10-CM

## 2025-04-04 PROCEDURE — 0232T NJX PLATELET PLASMA: CPT | Performed by: ORTHOPAEDIC SURGERY

## 2025-04-04 NOTE — PROCEDURES
Bilateral Shoulder Glenohumeral Joint Injection-platelet rich plasma    Name: Jackelin Guillermo   MRN: [id number]  Date: 4/4/2025     Clinical Indications:   Persistent Shoulder pain refractory to conservative measures.     After informed consent, the injection site was marked, sterilized with topical chlorhexidine antiseptic, and locally anesthetized with skin refrigerant.    The patient was seated upright and the shoulder was exposed. Using sterile technique: Platelet rich plasma, 20 cc was injected with a Anterior approach utilizing a 22 gauge needle.  A band-aid was applied.  The patient tolerated the procedure well.        Nanette Armstrong MD  Knee, Shoulder, & Elbow Surgery / Sports Medicine Specialist  Orthopaedic Surgery  [address] [place] [address].[email or web address]  [email or web address]  t: [phone]  o: [phone]  f: [phone]

## 2025-04-21 ENCOUNTER — OFFICE VISIT (OUTPATIENT)
Dept: INTERNAL MEDICINE CLINIC | Facility: CLINIC | Age: 50
End: 2025-04-21

## 2025-04-21 VITALS
BODY MASS INDEX: 26.36 KG/M2 | HEART RATE: 62 BPM | WEIGHT: 164 LBS | SYSTOLIC BLOOD PRESSURE: 116 MMHG | TEMPERATURE: 98 F | DIASTOLIC BLOOD PRESSURE: 60 MMHG | HEIGHT: 66 IN

## 2025-04-21 DIAGNOSIS — Z00.00 ROUTINE HEALTH MAINTENANCE: ICD-10-CM

## 2025-04-21 DIAGNOSIS — E55.9 VITAMIN D DEFICIENCY: ICD-10-CM

## 2025-04-21 DIAGNOSIS — K76.0 METABOLIC DYSFUNCTION-ASSOCIATED STEATOTIC LIVER DISEASE (MASLD): ICD-10-CM

## 2025-04-21 DIAGNOSIS — E03.8 OTHER SPECIFIED HYPOTHYROIDISM: Primary | ICD-10-CM

## 2025-04-21 RX ORDER — LEVOTHYROXINE SODIUM 88 UG/1
88 TABLET ORAL
Qty: 90 TABLET | Refills: 3 | Status: CANCELLED | OUTPATIENT
Start: 2025-04-21
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
## 2025-04-22 PROBLEM — M35.9 DIFFUSE CONNECTIVE TISSUE DISEASE (HCC): Status: RESOLVED | Noted: 2025-04-22 | Resolved: 2025-04-22

## 2025-04-25 ENCOUNTER — PATIENT MESSAGE (OUTPATIENT)
Dept: ORTHOPEDICS CLINIC | Facility: CLINIC | Age: 50
End: 2025-04-25

## 2025-05-05 ENCOUNTER — TELEPHONE (OUTPATIENT)
Dept: ORTHOPEDICS CLINIC | Facility: CLINIC | Age: 50
End: 2025-05-05

## 2025-05-05 ENCOUNTER — HOSPITAL ENCOUNTER (OUTPATIENT)
Dept: GENERAL RADIOLOGY | Age: 50
Discharge: HOME | End: 2025-05-05
Attending: ORTHOPAEDIC SURGERY
Payer: COMMERCIAL

## 2025-05-05 ENCOUNTER — OFFICE VISIT (OUTPATIENT)
Dept: ORTHOPEDICS CLINIC | Facility: CLINIC | Age: 50
End: 2025-05-05
Payer: COMMERCIAL

## 2025-05-05 ENCOUNTER — HOSPITAL ENCOUNTER (OUTPATIENT)
Dept: MRI IMAGING | Facility: HOSPITAL | Age: 50
Discharge: HOME | End: 2025-05-05
Attending: PHYSICIAN ASSISTANT
Payer: COMMERCIAL

## 2025-05-05 VITALS — WEIGHT: 160 LBS | HEIGHT: 66 IN | BODY MASS INDEX: 25.71 KG/M2

## 2025-05-05 DIAGNOSIS — M23.91 LOCKED KNEE, RIGHT: ICD-10-CM

## 2025-05-05 DIAGNOSIS — M25.461 EFFUSION OF RIGHT KNEE: ICD-10-CM

## 2025-05-05 DIAGNOSIS — M25.561 RIGHT KNEE PAIN, UNSPECIFIED CHRONICITY: Primary | ICD-10-CM

## 2025-05-05 DIAGNOSIS — M25.561 RIGHT KNEE PAIN, UNSPECIFIED CHRONICITY: ICD-10-CM

## 2025-05-05 DIAGNOSIS — Z01.89 ENCOUNTER FOR LOWER EXTREMITY COMPARISON IMAGING STUDY: ICD-10-CM

## 2025-05-05 DIAGNOSIS — M25.461 EFFUSION OF RIGHT KNEE: Primary | ICD-10-CM

## 2025-05-05 PROCEDURE — 73564 X-RAY EXAM KNEE 4 OR MORE: CPT | Performed by: ORTHOPAEDIC SURGERY

## 2025-05-05 PROCEDURE — 73721 MRI JNT OF LWR EXTRE W/O DYE: CPT | Performed by: PHYSICIAN ASSISTANT

## 2025-05-05 PROCEDURE — 73562 X-RAY EXAM OF KNEE 3: CPT | Performed by: ORTHOPAEDIC SURGERY

## 2025-05-05 NOTE — H&P
[facility] - ORTHOPEDICS  [address] [place] [address]  [phone]     NEW PATIENT VISIT - HISTORY AND PHYSICAL EXAMINATION     Name: Jackelin Guillermo   MRN: [id number]  Date: 5/5/2025     CC: Right knee pain.     REFERRED BY: Marixa Chen MD    HPI:   Jackelin Guillermo is a very pleasant 50 year old female who presents today for evaluation, consultation, and management of right knee pain, swelling, stiffness and feelings of locking ongoing for approximately 2 years.  She was previously treated in the past for Baker's cyst and underwent a steroid injection.  She rates her pain to be an 8 out of 10.  She is otherwise very active and had pain worse with walking.     She has had a hx of two steroid injections.       PMH:   Past Medical History[1]    PAST SURGICAL HX:  Past Surgical History[2]    FAMILY HX:  Family History[3]    ALLERGIES:  Patient has no known allergies.    MEDICATIONS: Current Medications[4]    ROS: A comprehensive 14 point review of systems was performed and was negative aside from the aforementioned per history of present illness.    SOCIAL HX:  Social History     Occupational History    Not on file   Tobacco Use    Smoking status: Never     Passive exposure: Never    Smokeless tobacco: Never   Vaping Use    Vaping status: Never Used   Substance and Sexual Activity    Alcohol use: Yes     Alcohol/week: 4.0 standard drinks of alcohol     Types: 4 Glasses of wine per week     Comment: Socially    Drug use: Never    Sexual activity: Not on file       PE:   Vitals:    05/05/25 1405   Weight: 160 lb (72.6 kg)   Height: 5' 6\" (1.676 m)     Estimated body mass index is 25.82 kg/m² as calculated from the following:    Height as of this encounter: 5' 6\" (1.676 m).    Weight as of this encounter: 160 lb (72.6 kg).    Physical Exam  Constitutional:       Appearance: Normal appearance.   HENT:      Head: Normocephalic and atraumatic.   Eyes:      Extraocular  Movements: Extraocular movements intact.   Neck:      Musculoskeletal: Normal range of motion and neck supple.   Cardiovascular:      Pulses: Normal pulses.   Pulmonary:      Effort: Pulmonary effort is normal. No respiratory distress.   Abdominal:      General: There is no distension.   Skin:     General: Skin is warm.      Capillary Refill: Capillary refill takes less than 2 seconds.      Findings: No bruising.   Neurological:      General: No focal deficit present.      Mental Status: Alert.   Psychiatric:         Mood and Affect: Mood normal.     Examination of the right knee demonstrates:     Skin is intact, warm and dry.   Atrophy: none    Effusion: large    Joint line tenderness: none  Crepitation: none   Nely: Positive   Patellar mobility: normal without apprehension  J-sign: none    ROM: Extension full  Flexion 120 degreesv  ACL:  guarded   PCL:  Negative Posterior Drawer  Collateral Ligaments: Stable to Varus and Valgus stress at 0 and 30 degrees  Strength: normal   Hip joint: normal pain-free ROM   Gait:  mildly antalgic   Leg length: equal and symmetric  Alignment:  neutral     No obvious peripheral edema noted.   Distal neurovascular exam demonstrates normal perfusion, intact sensation to light touch and full strength.       Radiographic Examination/Diagnostics:  I personally viewed, independently interpreted and radiology report was reviewed.    MRI Right Knee, IMPRESSION:   1. Mild medial collateral ligament sprain.   2. Mild patellar, semimembranosus, and biceps femoris tendinosis. Mild gastrocnemius muscle strain. Small to moderate-sized ruptured Baker's cyst.   3. Medial meniscal degeneration and edema at the posterior meniscocapsular junction without discrete tear.   4. 4 x 6 mm high-grade chondral defect at the mid weightbearing lateral femoral condyle. Cartilage loss of the patellofemoral compartment is more severe at the lateral femoral trochlea.   5. Small joint effusion with small loose  bodies.     IMPRESSION: Jackelin Guillermo is a 50 year old female who presents with right knee effusion, concerning for internal derangement/acute meniscus tear.     PLAN:   We had a detailed discussion outlining the etiology, anatomy, pathophysiology, and natural history of the patient's findings. Imaging was reviewed in detail and correlated to a 3-dimensional model of the patient's pathology.     We reviewed the treatment of this disease condition.  In light of the acute traumatic incident, loss of normal function, and  failure to progress conservatively we recommend an MRI to evaluate the integrity of the patient's right knee meniscus and rule out internal deranagement. The patient will follow up after imaging.   Differential diagnosis includes but not limited to: cartilage injury/loose body, meniscus tear/injury, ACL tear, bone marrow edema, and osteoarthritis.     External records were also reviewed for pertinent historical findings contributing to the patients undiagnosed new problem with uncertain prognosis.     Her prior MRI showed loose bodies, and chondral wear - I am worried about progression of disease, and she will likely require surgical intervention.     The patient had the opportunity to ask questions, and all questions were answered appropriately.         FOLLOW-UP:  Return to clinic following completion of MRI to review scan and findings.             Celso Duffy [facility], PA-C Orthopedic Surgery / Sports Medicine Specialist  [facility]  [address] [place] [address].[email or web address]  [email or web address]  t: [phone]  o: [phone]  f: [phone]    This note was dictated using Dragon software.  While it was briefly proofread prior to completion, some grammatical, spelling, and word choice errors due to dictation may still occur.         [1]   Past Medical History:   Bronchitis    hospital 7/10    Bronchospasm    severe; hospital 7/10    Fatty liver     liver bx    Hypothyroidism    Obesity    Raynaud disease    hands   [2]   Past Surgical History:  Procedure Laterality Date          Cholecystectomy      D & c      Other  1993    elevctive termination of pregnancy   [3]   Family History  Problem Relation Age of Onset    Diabetes Mother     Hypertension Mother     Obesity Mother     Diabetes Father     Heart Disease Father         CAD    Hypertension Father     Heart Attack Father     Obesity Father     Pancreatic Cancer Father 68    Cancer Father         penile    Cancer Maternal Grandmother         Brain tumor    Heart Attack Maternal Grandfather     Heart Disease Maternal Grandfather         Pacemaker    Hypertension Paternal Grandmother     Breast Cancer Paternal Grandmother 89    Diabetes Paternal Grandfather     Heart Disease Paternal Grandfather     Bleeding Disorders Neg     DVT/VTE Neg    [4]   Current Outpatient Medications   Medication Sig Dispense Refill    clobetasol 0.05 % External Ointment Apply 1 Application topically 2 (two) times daily. 30 g 1    levothyroxine 88 MCG Oral Tab Take 1 tablet (88 mcg total) by mouth before breakfast. 90 tablet 3    Multiple Vitamins-Minerals (WOMENS MULTI VITAMIN & MINERAL) Oral Tab Take by mouth.      Cholecalciferol (VITAMIN D) 50 MCG (2000 UT) Oral Tab Take 4,000 Units by mouth daily. 1 tablet 0

## 2025-05-05 NOTE — TELEPHONE ENCOUNTER
Spoke with patient her RIGHT knee is swollen and she cannot bear weight without 10/10 pain.   Booked patient an appointment with Sincer today at 1410.

## 2025-05-05 NOTE — TELEPHONE ENCOUNTER
Established patient called to request an appointment \"today\" for excruciating knee pain, left side. She states she can't hardly walk. Please advise.

## 2025-05-07 ENCOUNTER — TELEPHONE (OUTPATIENT)
Dept: ORTHOPEDICS CLINIC | Facility: CLINIC | Age: 50
End: 2025-05-07

## 2025-05-07 ENCOUNTER — OFFICE VISIT (OUTPATIENT)
Dept: ORTHOPEDICS CLINIC | Facility: CLINIC | Age: 50
End: 2025-05-07
Payer: COMMERCIAL

## 2025-05-07 DIAGNOSIS — M25.461 EFFUSION OF RIGHT KNEE: ICD-10-CM

## 2025-05-07 DIAGNOSIS — M65.969 SYNOVITIS OF KNEE: ICD-10-CM

## 2025-05-07 DIAGNOSIS — M75.82 TENDINITIS OF LEFT ROTATOR CUFF: ICD-10-CM

## 2025-05-07 DIAGNOSIS — M94.261 CHONDROMALACIA OF RIGHT KNEE: ICD-10-CM

## 2025-05-07 DIAGNOSIS — S83.271A COMPLEX TEAR OF LATERAL MENISCUS OF RIGHT KNEE AS CURRENT INJURY, INITIAL ENCOUNTER: Primary | ICD-10-CM

## 2025-05-07 PROCEDURE — 99215 OFFICE O/P EST HI 40 MIN: CPT | Performed by: ORTHOPAEDIC SURGERY

## 2025-05-07 PROCEDURE — 20610 DRAIN/INJ JOINT/BURSA W/O US: CPT | Performed by: ORTHOPAEDIC SURGERY

## 2025-05-07 RX ORDER — TRIAMCINOLONE ACETONIDE 40 MG/ML
40 INJECTION, SUSPENSION INTRA-ARTICULAR; INTRAMUSCULAR ONCE
Status: COMPLETED | OUTPATIENT
Start: 2025-05-07 | End: 2025-05-07

## 2025-05-07 RX ORDER — KETOROLAC TROMETHAMINE 30 MG/ML
30 INJECTION, SOLUTION INTRAMUSCULAR; INTRAVENOUS ONCE
Status: COMPLETED | OUTPATIENT
Start: 2025-05-07 | End: 2025-05-07

## 2025-05-07 RX ADMIN — KETOROLAC TROMETHAMINE 30 MG: 30 INJECTION, SOLUTION INTRAMUSCULAR; INTRAVENOUS at 09:40:00

## 2025-05-07 RX ADMIN — TRIAMCINOLONE ACETONIDE 40 MG: 40 INJECTION, SUSPENSION INTRA-ARTICULAR; INTRAMUSCULAR at 09:40:00

## 2025-05-07 NOTE — TELEPHONE ENCOUNTER
Spoke with patient and we scheduled surgery and post op,    Patient will call back to go over preoperative procedures.

## 2025-05-07 NOTE — PROCEDURES
Right Knee Intra-articular Injection / aspiration    Name: Jackelin Guillermo   MRN: [id number]  Date: 5/7/2025     Clinical Indications:   Persistent knee pain refractory to conservative measures.     After informed consent, the injection site was marked, sterilized with topical chlorhexidine antiseptic, and locally anesthetized with skin refrigerant.    The patient was situation in a comfortable position. Using sterile technique: 25 ml of serous fluid underwent Aspiration with 18 gauge needle-  1 mL of 30mg/mL of Ketorolac, 2 mL of 0.5% Bupivicaine, 2 mL of 1% Lidocaine, and 1 mL of 40 mg/ml Triamcinolone was injected.  A band-aid was applied.  The patient tolerated the procedure well.        Nanette Armstrong MD  Knee, Shoulder, & Elbow Surgery / Sports Medicine Specialist  Orthopaedic Surgery  [address] [place] [address].org  [email or web address]  t: [phone]  o: [phone]  f: [phone]

## 2025-05-07 NOTE — TELEPHONE ENCOUNTER
Date of Surgery: [facility] 2025       Post Op Appt:  2025 940    Case ID: [id number]     Notes: Lets please add for , thanks!             OR BOOKING SHEET KNEE ARTHROSCOPY  Location: [] [facility]                    [x] [facility]  Name: Jackelin Guillermo  MRN: [id number]   : 3/25/1975  Diagnosis:  [x] Complex tear of lateral meniscus of right knee as current injury, initial encounter [S83.643A]  Disposition:    [x] Ambulatory  Operative Time Required: 45 minutes ([facility])  Procedure:  Antibiotics: 2 g cefazolin within 60 minutes of surgical incision (3 g if > 120 kg)  Laterality:                  [x] RIGHT                  [] LEFT                          [] BILATERAL  Procedures:                    [x] Knee Arthroscopy                                                   [] Partial Medial Meniscectomy (55738)                    [x] Partial Lateral Meniscectomy (91891)                    [] Partial Medial and Lateral Meniscectomy (55396)                       [] Lateral Meniscus Repair (11974)                    [] Medial Meniscus Repair (98530)                       [x] Synovectomy (83546)                    [x] Abrasionplasty (66692)                       [] Partial Medial Meniscectomy vs Medial Meniscus Repair (51544 vs 34594)     Additional info:   [] PCP Clearance Needed  [] MRSA  [x] Physical Therapy External [facility]  [] DME Rx  [] Appt with Dr. Armstrong needed  Implants needed: None  Positioning Equipment: Supine with Lateral Post

## 2025-05-07 NOTE — PROGRESS NOTES
OR BOOKING SHEET KNEE ARTHROSCOPY  Location: [] [facility]   [x] [facility]  Name: Jackelin Guillermo  MRN: [id number]   : 3/25/1975  Diagnosis:  [x] Complex tear of lateral meniscus of right knee as current injury, initial encounter [S83.058A]  Disposition:    [x] Ambulatory  Operative Time Required: 45 minutes ([facility])  Procedure:  Antibiotics: 2 g cefazolin within 60 minutes of surgical incision (3 g if > 120 kg)  Laterality: [x] RIGHT  [] LEFT                       [] BILATERAL  Procedures:   [x] Knee Arthroscopy     [] Partial Medial Meniscectomy (34546)   [x] Partial Lateral Meniscectomy (80844)                    [] Partial Medial and Lateral Meniscectomy (32432)     [] Lateral Meniscus Repair (59484)                    [] Medial Meniscus Repair (58524)     [x] Synovectomy (56151)   [x] Abrasionplasty (12207)     [] Partial Medial Meniscectomy vs Medial Meniscus Repair (50460 vs 91285)    Additional info:   [] PCP Clearance Needed  [] MRSA  [x] Physical Therapy External [facility]  [] DME Rx  [] Appt with Dr. Armstrong needed  Implants needed: None  Positioning Equipment: Supine with Lateral Post

## 2025-05-07 NOTE — H&P
Orthopaedic Surgery  [address] [place] [address]  [phone]     PRE SURGICAL - HISTORY AND PHYSICAL EXAMINATION     Name: Jackelin Guillermo   MRN: [id number]  Date: 5/7/2025     CC: Right Knee Pain and mechanical symptoms    REFERRED BY: Marixa Chen MD    HPI:   Jackelin Guillermo is a very pleasant 50 year old female who presents today for evaluation of Right knee pain and mechanical symptoms and recent completion of MRI demonstrating meniscal pathology.     To summarize: right knee pain, swelling, stiffness and feelings of locking ongoing for approximately 2 years.  She was previously treated in the past for Baker's cyst and underwent a steroid injection.  She rates her pain to be an 8 out of 10.  She is otherwise very active and had pain worse with walking.      She has had a hx of two steroid injections.     At the patient's last visit, evaluation was performed by my colleague Celso Duffy PA-C who recommended an MRI. The patient was then referred to me for consultation, and presents today for further discussion.       PMH:   Past Medical History[1]    PAST SURGICAL HX:  Past Surgical History[2]    FAMILY HX:  Family History[3]    ALLERGIES:  Patient has no known allergies.    MEDICATIONS: Current Medications[4]    ROS: A comprehensive 14 point review of systems was performed and was negative aside from the aforementioned per history of present illness.    SOCIAL HX:  Social History     Tobacco Use    Smoking status: Never     Passive exposure: Never    Smokeless tobacco: Never   Substance Use Topics    Alcohol use: Yes     Alcohol/week: 4.0 standard drinks of alcohol     Types: 4 Glasses of wine per week     Comment: Socially       PE:   There were no vitals filed for this visit.  Estimated body mass index is 25.82 kg/m² as calculated from the following:    Height as of 5/5/25: 5' 6\" (1.676 m).    Weight as of 5/5/25: 160 lb (72.6 kg).    Physical Exam  Constitutional:        Appearance: Normal appearance.   HENT:      Head: Normocephalic and atraumatic.   Eyes:      Extraocular Movements: Extraocular movements intact.   Neck:      Musculoskeletal: Normal range of motion and neck supple.   Cardiovascular:      Pulses: Normal pulses.   Pulmonary:      Effort: Pulmonary effort is normal. No respiratory distress.   Abdominal:      General: There is no distension.   Skin:     General: Skin is warm.      Capillary Refill: Capillary refill takes less than 2 seconds.      Findings: No bruising.   Neurological:      General: No focal deficit present.      Mental Status: Alert.   Psychiatric:         Mood and Affect: Mood normal.     Examination of the right knee demonstrates:     Skin is intact, warm and dry.   Atrophy: mild    Effusion: moderate    Joint line tenderness: both  Crepitation: mild   Nely: Positive   Patellar mobility: normal without apprehension  J-sign: none    ROM: Extension lacking 10 degrees  Flexion 90 degrees  ACL:  Negative Lachman, Negative Pivot Shift   PCL:  Negative Posterior Drawer  Collateral Ligaments: Stable to Varus and Valgus stress at 0 and 30 degrees  Strength: significantly weak   Hip joint: normal pain-free ROM   Gait:  mildly antalgic   Leg length: equal and symmetric  Alignment:  neutral     No obvious peripheral edema noted.   Distal neurovascular exam demonstrates normal perfusion, intact sensation to light touch and full strength.     Examination of the contralateral knee demonstrates:  No significant atrophy, swelling or effusion. Full range of motion. Neurovascularly intact distally.    Radiographic Examination/Diagnostics:  XR and MRI of the knee personally viewed, independently interpreted and radiology report was reviewed.    MRI KNEE, RIGHT (LEY=61808)  Result Date: 5/6/2025  PROCEDURE:  MRI KNEE, RIGHT (CPT=73721)  COMPARISON:  None.  INDICATIONS:  M23.91 Locked knee, right M25.461 Effusion of right knee  TECHNIQUE:  Axial, coronal, and  sagittal proton density with and without fat saturation images were obtained.  PATIENT STATED HISTORY: (As transcribed by Technologist)  knee pain, swelling and instability    FINDINGS:  LIGAMENTS:          Normal ACL and PCL.  Findings of MCL sprain.  Normal IT band, fibular collateral ligament, posterolateral corner structures and patellar retinacula.  MENISCI:            Normal medial meniscus.  Horizontal cleavage tear involving the anterior horn and body segment medial meniscus.  These findings are on a background of mucoid degeneration.  Parameniscal cyst along the anterior horn measures 1.6 x 0.5 x 0.9 cm (series 6, image 14, series 8, image 8). TENDONS:            The tendinous insertions about the knee are intact without significant tendinosis or tears. MUSCULATURE:        No evidence of strain, edema, or atrophy. BONY COMPARTMENTS:  No acute fracture or malalignment.  Articular cartilage preserved within the medial compartment.  Broad area of high-grade partial thickness chondral loss and reactive subchondral marrow signal noted along the anterior weight-bearing surface of the lateral femoral condyle, contiguous with the lateral femoral trochlea.  Shallow chondral fissuring of the lateral patellar facet. SYNOVIUM:           Moderate knee joint effusion.  No intra-articular bodies.  Baker cyst measures approximately 6 x 3 x 2 cm with fluid tracking along the superficial fascia of the medial head of the gastrocnemius indicating partial cyst rupture.  The popliteal neurovascular bundle is within normal limits.             CONCLUSION:     1. Horizontal cleavage tear involving the anterior horn and body segment medial meniscus.  These findings are on a background of mucoid degeneration.  Parameniscal cyst along the anterior horn measures 1.6 x 0.5 x 0.9 cm.    2. Findings of MCL sprain.  Normal cruciate ligaments.    3. Broad area of high-grade partial thickness chondral loss and reactive subchondral marrow  signal along the anterior weight-bearing surface of the lateral femoral condyle, contiguous with the lateral femoral trochlea.  Shallow chondral fissuring of the lateral patellar facet.    4. Moderate knee joint effusion.  Patino's cyst measures 6 x 3 x 2 cm with fluid tracking along the superficial fascia of the medial head of the gastrocnemius indicating partial cyst rupture.    LOCATION:  [facility]          Dictated by (CST): Devan Noriega MD on 5/06/2025 at 8:06 AM     Finalized by (CST): Devan Noriega MD on 5/06/2025 at 8:15 AM       XR KNEE (3 VIEWS), LEFT (CPT=73562)  Result Date: 5/5/2025  PROCEDURE:  XR KNEE ROUTINE (3 VIEWS), LEFT (CPT=73562)  TECHNIQUE:  Three views were obtained including patellar view.  COMPARISON:  None.  INDICATIONS:  Knee pain  PATIENT STATED HISTORY: (As transcribed by Technologist)  Ortho evaluation. Patient states she has chronic right knee pain that worsened 1 week ago. Left knee for comparison. Denies any injury.       FINDINGS:  Negative for fracture, dislocation, deformity or other acute bony abnormality. Osteoarthritic changes are noted, minimal degree.  No soft tissue abnormalities.             CONCLUSION:  Minimal osteoarthritic changes are present. No acute fracture or other acute bony process.  LOCATION:  [facility]   Dictated by ([facility]): Kwadwo Sanders MD on 5/05/2025 at 2:18 PM     Finalized by (CST): Kwadwo Sanders MD on 5/05/2025 at 2:18 PM       XR KNEE, COMPLETE (4 OR MORE VIEWS), RIGHT (CPT=73564)  Result Date: 5/5/2025  PROCEDURE:  XR KNEE, COMPLETE (4 OR MORE VIEWS), RIGHT (CPT=73564)  TECHNIQUE:  AP, lateral, sunrise, and tunnel views were obtained  COMPARISON:  None.  INDICATIONS:  M25.561 Right knee pain, unspecified chronicity  PATIENT STATED HISTORY: (As transcribed by Technologist)  Ortho evaluation. Patient states she has chronic right knee pain that worsened 1 week ago. Left knee for comparison. Denies any injury.     FINDINGS:  Negative for fracture, dislocation,  deformity or other acute bony abnormality. Osteoarthritic changes are noted, minimal degree.  Soft tissue swelling anterior knee.  Trace joint effusion seen.             CONCLUSION:  Soft tissue swelling anterior knee.  Trace joint effusion seen.Minimal osteoarthritic changes are present. No acute fracture or other acute bony process.   LOCATION:  [facility]   Dictated by (CST): Kwadwo Sanders MD on 5/05/2025 at 2:16 PM     Finalized by (CST): Kwadwo Sanders MD on 5/05/2025 at 2:17 PM         IMPRESSION: Jackelin Guillermo is a 50 year old female with horizontal cleavage Medial Meniscus Tear in the setting of knee chondromalacia with persistent joint effusion.  They have failed extensive conservative treatment including Physical therapy greater than 6 weeks, intra-articular knee corticosteroid injection, oral anti-inflammatory medications, and activity modification.     Consequently, they are an appropriate candidate for knee arthroscopy, partial meniscectomy, abrasionplasty and extensive debridement/synovectomy.    PLAN:   I had a lengthy discussion with Jackelin about the diagnosis and options, both surgical and nonsurgical. I have recommended that we proceed with arthroscopic surgical treatment as we agree that this intervention will likely offer the best opportunity for symptomatic relief and functional recovery. I used the MRI scan and a 3-dimensional knee model to outline her pathology, as well as general surgical principles. We reviewed the risks associated with arthroscopic partial meniscectomy and debridement / synovectomy.  In particular I emphasized that the displaced flap component and degenerative portion of the meniscus would be removed as this is dysvascular.  Simultaneously, I will perform a diagnostic knee arthroscopy of the knee and hope to identify and address any other pathology that may be encountered such as scar tissue, inflammation, cartilage pathology.  In particular we discussed risks that  include, a low risk of infection (<1%), potential transient or permanent injury to nerves with superficial numbness, joint stiffness which may require additional physical therapy, persistent pain/lack of symptomatic improvement, need for future operation, wound complications (rare as incisions are very small), deep vein thrombosis (DVT) and pulmonary embolism (PE).   We discussed the proposed rehabilitation timeline as well as expected postoperative restrictions.  In this regard, I emphasized that there will be no weightbearing or range of motion restrictions post operatively.  Crutches will be provided initially for patient comfort and I will aim to have the patient begin physical therapy on postoperative day 1.  The advantage of this is to begin immediate mobility and range of motion to mitigate pain and encourage reduction of inflammation/swelling.  This will ultimately lead to a quicker postsurgical rehabilitation.  For most patients, this requires a total of 4 to 6 weeks of postsurgical physical therapy to attain full functional status after simple knee arthroscopy.  Jackelin voiced a good understanding of treatment options, risks and benefits, postoperative instructions, rehabilitation timeline, and restrictions. She was given the opportunity to ask questions, which were all answered to the best of my ability and to her satisfaction. Jackelin will work with my office to arrange a time for surgery and obtain any medical clearance information necessary. My pre-operative information packet, which details the process and answers many FAQ's will be provided. She was encouraged to call the office with any further questions or concerns.  Additionally underwent knee aspiration and injection at today's visit. 25 ml of serous fluid extracted.   Assessment & Plan  Degenerative tear of medial meniscus with parameniscal cyst  Degenerative tear of the medial meniscus with parameniscal cyst causing pain, swelling, and limited knee  function. MRI shows a cleavage tear and cyst. She opted for conservative management.  - Perform knee aspiration and steroid injection in-office.  - Schedule knee arthroscopy for May 29, 2025, if symptoms persist.  - Provide physical therapy referral for post-surgical rehabilitation.    Right rotator cuff injury  Persistent pain and restricted range of motion in the right shoulder with limited improvement from kristin-based exercises.  - Refer to physical therapy for range of motion and strength improvement.    I spent 60 minutes in preparation to see the patient, counseling/education of relevant pathology, discussing imaging results, ordering post surgical physical therapy intervention, surgical counseling, and care coordination.        FOLLOW-UP:  Post-Operative Visit, POD 6 with Santhoshr KAY Duffy PA-C.         Nanette Armstrong MD  Knee, Shoulder, & Elbow Surgery / Sports Medicine Specialist  Orthopaedic Surgery  [address] [place] [address].org  [email or web address]  t: [phone]  o: [phone]  f: [phone]    This note was dictated using Dragon software.  While it was briefly proofread prior to completion, some grammatical, spelling, and word choice errors due to dictation may still occur.         [1]   Past Medical History:   Bronchitis    hospital 7/10    Bronchospasm    severe; hospital 7/10    Fatty liver    liver bx    Hypothyroidism    Obesity    Raynaud disease    hands   [2]   Past Surgical History:  Procedure Laterality Date          Cholecystectomy      D & c      Other      elevctive termination of pregnancy   [3]   Family History  Problem Relation Age of Onset    Diabetes Mother     Hypertension Mother     Obesity Mother     Diabetes Father     Heart Disease Father         CAD    Hypertension Father     Heart Attack Father     Obesity Father     Pancreatic Cancer Father 68    Cancer Father         penile    Cancer Maternal Grandmother          Brain tumor    Heart Attack Maternal Grandfather     Heart Disease Maternal Grandfather         Pacemaker    Hypertension Paternal Grandmother     Breast Cancer Paternal Grandmother 89    Diabetes Paternal Grandfather     Heart Disease Paternal Grandfather     Bleeding Disorders Neg     DVT/VTE Neg    [4]   Current Outpatient Medications   Medication Sig Dispense Refill    clobetasol 0.05 % External Ointment Apply 1 Application topically 2 (two) times daily. 30 g 1    levothyroxine 88 MCG Oral Tab Take 1 tablet (88 mcg total) by mouth before breakfast. 90 tablet 3    Multiple Vitamins-Minerals (WOMENS MULTI VITAMIN & MINERAL) Oral Tab Take by mouth.      Cholecalciferol (VITAMIN D) 50 MCG (2000 UT) Oral Tab Take 4,000 Units by mouth daily. 1 tablet 0

## 2025-05-13 NOTE — TELEPHONE ENCOUNTER
Patient called [facility] to let them know she needs to cancel surgery because she got a new job. [facility] canceled surgery    Surgery Canceled

## 2025-06-12 RX ORDER — LEVOTHYROXINE SODIUM 88 UG/1
88 TABLET ORAL
Qty: 90 TABLET | Refills: 1 | Status: SHIPPED | OUTPATIENT
Start: 2025-06-12

## 2025-06-12 NOTE — TELEPHONE ENCOUNTER
Refill request is for a maintenance medication and has met the criteria specified in the Ambulatory Medication Refill Standing Order for eligibility, visits, laboratory, alerts and was sent to the requested pharmacy.    Requested Prescriptions     Signed Prescriptions Disp Refills    levothyroxine 88 MCG Oral Tab 90 tablet 1     Sig: TAKE 1 TABLET(88 MCG) BY MOUTH BEFORE BREAKFAST     Authorizing Provider: SUNIL ARIAS     Ordering User: CRISTOPHER CARBAJAL

## 2025-07-26 ENCOUNTER — HOSPITAL ENCOUNTER (OUTPATIENT)
Dept: MAMMOGRAPHY | Facility: HOSPITAL | Age: 50
Discharge: HOME | End: 2025-07-26
Attending: INTERNAL MEDICINE
Payer: COMMERCIAL

## 2025-07-26 DIAGNOSIS — Z12.31 ENCOUNTER FOR SCREENING MAMMOGRAM FOR MALIGNANT NEOPLASM OF BREAST: ICD-10-CM

## 2025-07-26 PROCEDURE — 77067 SCR MAMMO BI INCL CAD: CPT | Performed by: INTERNAL MEDICINE

## 2025-07-26 PROCEDURE — 77063 BREAST TOMOSYNTHESIS BI: CPT | Performed by: INTERNAL MEDICINE

## (undated) DIAGNOSIS — N95.0 POSTMENOPAUSAL BLEEDING: Primary | ICD-10-CM

## (undated) DIAGNOSIS — N17.9 AKI (ACUTE KIDNEY INJURY) (HCC): Primary | ICD-10-CM

## (undated) DEVICE — ENCORE® LATEX ACCLAIM SIZE 6.5, STERILE LATEX POWDER-FREE SURGICAL GLOVE: Brand: ENCORE

## (undated) DEVICE — HYSTEROSCOPY: Brand: MEDLINE INDUSTRIES, INC.

## (undated) DEVICE — MYOSURE SINGLE USE SEAL SET

## (undated) DEVICE — STERILE SURGICAL LUBRICANT, METAL TUBE: Brand: SURGILUBE

## (undated) DEVICE — SET TUBI Y FL CNTRL INFL/OTFL

## (undated) DEVICE — SOL  .9 3000ML

## (undated) DEVICE — SOCK CNSTR 4IN TNPSL UNV SPEC

## (undated) NOTE — LETTER
AUTHORIZATION FOR SURGICAL OPERATION OR OTHER PROCEDURE    1.  I hereby authorize Dr. Kim Novak  and [facility] staff assigned to my case to perform the following operation and/or procedure at the [facility]:    Kady ______/______/_____                    Time:  ________ A. M.  P.M.        Patient Name:  ______________________________________________________  (please print)      Patient signature:  ___________________________________________________             Relations

## (undated) NOTE — LETTER
02/25/19        David 64  [address] [facility]      Dear [facility],    [facility] records indicate that you have outstanding lab work and or testing that was ordered for you and has not yet been completed:    Hepatic Function Panel    To

## (undated) NOTE — LETTER
MINOR CASE LETTER      1/20/2021        Dear Nancy Nolen,    Your are having a Hysteroscopy-surgical, possible myosure on Friday,02/12/2021 at 3:00pm at [facility].    Do not eat or drink anything (including water) after midnight the night before [address] [facility]  [address]  [address] [facility]  [phone]    Document electronically generated by:  Leonor Goodman

## (undated) NOTE — LETTER
AUTHORIZATION FOR SURGICAL OPERATION OR OTHER PROCEDURE    1. I hereby authorize Dr. fernandez, and [facility] staff assigned to my case to perform the following operation and/or procedure at the [facility]:    Enodmetrial biopsy    2.   My physic Relationship to Patient:           []  Parent    Responsible person                          []  Spouse  In case of minor or                    [] Other  _____________   Incompetent name:  __________________________________________________

## (undated) NOTE — LETTER
2017    Patient: Mj Barroso  : 3/25/1975 Visit date: 2017    Dear  Dr. Chung Malcolm MD,    Noemi Cardenas is a pleasant but unfortunate 38year old,, female, who was referred to us for weight management recommendations.   Noemi Theresa is interes

## (undated) NOTE — MR AVS SNAPSHOT
[facility]  [address] [facility] [address] [phone]               Thank you for choosing us for your health care visit with Jerel Delaney. Brooke Gambino MD.  We are glad to serve you and happy to provide you with this summary of your visit. Inhale 2 puffs into the lungs every 4 to 6 hours as needed.    Commonly known as:  PROAIR HFA           Clobetasol Propionate 0.05 % Oint   Commonly known as:  TEMOVATE           * ipratropium-albuterol 0.5-2.5 (3) MG/3ML Soln   Take 3 mL by nebulization 4

## (undated) NOTE — MR AVS SNAPSHOT
[facility] [place]  [address] [place] [address]  [phone]               Thank you for choosing us for your health care visit with Jacolyn Habermann, MD.  We are glad to serve you and happy to provide you with this summary of your visit.   Ghislaine Instructions:   To schedule a test at any [facility], call Central Scheduling at [phone], Monday through Friday between 7:30am to 6pm and on Saturday between 8am and 1pm. Evening and weekend appointments for your exam are a Take 3 mL by nebulization 4 (four) times daily as needed. Commonly known as:  DUONEB           Levothyroxine Sodium 88 MCG Tabs   TAKE 1 TABLET(88 MCG) BY MOUTH BEFORE BREAKFAST   What changed:  See the new instructions.    Commonly known as:  SYNTHROID, Don’t eat while when you’re bored.      EAT THESE FOODS MORE OFTEN: EAT THESE FOODS LESS OFTEN:   Make half your plate fruits and vegetables Highly refined, white starches including white bread, rice and pasta   Eat plenty of protein, keep the fat content l

## (undated) NOTE — LETTER
AUTHORIZATION FOR SURGICAL OPERATION OR OTHER PROCEDURE    1. I hereby authorize AGATA Gaspar and [facility] staff assigned to my case to perform the following operation and/or procedure at the [facility] site:    VULVA BIOPSY      _______________________________________________________________________________________________    2.  My physician has explained the nature and purpose of the operation or other procedure, possible alternative methods of treatment, the risks involved, and the possibility of complication to me.  I acknowledge that no guarantee has been made as to the result that may be obtained.  3.  I recognize that, during the course of this operation, or other procedure, unforseen conditions may necessitate additional or different procedure than those listed above.  I, therefore, further authorize and request that the above named physician, his/her physician assistants or designees perform such procedures as are, in his/her professional opinion, necessary and desirable.  4.  Any tissue or organs removed in the operation or other procedure may be disposed of by and at the discretion of the [facility] and [facility].  5.  I understand that in the event of a medical emergency, I will be transported by local paramedics to [facility] or other hospital emergency department.  6.  I certify that I have read and fully understand the above consent to operation and/or other procedure.    7.  I acknowledge that my physician has explained sedation/analgesia administration to me including the risks and benefits.  I consent to the administration of sedation/analgesia as may be necessary or desirable in the judgement of my physician.    Witness signature: ___________________________________________________ Date:  ______/______/_____                    Time:  ________ A.M.  P.M.       Patient Name:   ______________________________________________________  (please print)      Patient signature:  ___________________________________________________             Relationship to Patient:           []  Parent    Responsible person                          []  Spouse  In case of minor or                    [] Other  _____________   Incompetent name:  __________________________________________________                               (please print)      _____________      Responsible person  In case of minor or  Incompetent signature:  _______________________________________________    Statement of Physician  My signature below affirms that prior to the time of the procedure, I have explained to the patient and/or his/her guardian, the risks and benefits involved in the proposed treatment and any reasonable alternative to the proposed treatment.  I have also explained the risks and benefits involved in the refusal of the proposed treatment and have answered the patient's questions.                        Date:  ______/______/_______  Provider                      Signature:  __________________________________________________________       Time:  ___________ A.M    P.M.

## (undated) NOTE — MR AVS SNAPSHOT
After Visit Summary   12/12/2019    Julita Brown    MRN: [id number]           Visit Information     Date & Time  12/12/2019 10:40 AM Provider  Tio Shipman MD [address] [place] [address], [address] [place] [address],[address], [facility]  P THINPREP PAP SMEAR B [[id number] CUSTOM]  12/12/2019 12/12/2020      Future Appointments        Provider Department    12/20/2019 8:00 AM Cassandra Blake  for [facility], [address]    10/14/2020 10:00 AM Felicia Dunlap Saturday - Sunday  10:00 am - 4:00 pm      [address]  Monday - Friday  4:00 pm - 10:00 pm  Saturday - Sunday  10:00 am - 4:00 pm       Conditions needing urgent attention, but are not life-threatening.          Average cost  $120*       EMERGENCY ROOM

## (undated) NOTE — LETTER
AUTHORIZATION FOR SURGICAL OPERATION OR OTHER PROCEDURE    1. I hereby authorize Dr. Olga Elmore, and [address] [place] [address] staff assigned to my case to perform the following operation and/or procedure at the [address] [place] [address]:    Endometrial biopsy        2. My physician has explained the nature and purpose of the operation or other procedure, possible alternative methods of treatment, the risks involved, and the possibility of complication to me. I acknowledge that no guarantee has been made as to the result that may be obtained. 3.  I recognize that, during the course of this operation, or other procedure, unforseen conditions may necessitate additional or different procedure than those listed above. I, therefore, further authorize and request that the above named physician, his/her physician assistants or designees perform such procedures as are, in his/her professional opinion, necessary and desirable. 4.  Any tissue or organs removed in the operation or other procedure may be disposed of by and at the discretion of the [address] [place] [address] and [facility]. 5.  I understand that in the event of a medical emergency, I will be transported by local paramedics to [facility] or other hospital emergency department. 6.  I certify that I have read and fully understand the above consent to operation and/or other procedure. 7.  I acknowledge that my physician has explained sedation/analgesia administration to me including the risks and benefits. I consent to the administration of sedation/analgesia as may be necessary or desirable in the judgement of my physician. Witness signature: ___________________________________________________ Date:  ______/______/_____                    Time:  ________ A. M.  P.M.        Patient Name:  Selene Zmorzynski______________________________  (please print)      Patient signature:  ___________________________________________________             Relationship to Patient:           []  Parent    Responsible person                          []  Spouse  In case of minor or                    [] Other  _____________   Incompetent name:  __________________________________________________                               (please print)      _____________      Responsible person  In case of minor or  Incompetent signature:  _______________________________________________    Statement of Physician  My signature below affirms that prior to the time of the procedure, I have explained to the patient and/or his/her guardian, the risks and benefits involved in the proposed treatment and any reasonable alternative to the proposed treatment. I have also explained the risks and benefits involved in the refusal of the proposed treatment and have answered the patient's questions.                         Date:  ______/______/_______  Provider                      Signature:  __________________________________________________________       Time:  ___________ A.M    P.M.

## (undated) NOTE — LETTER
Patient Name: Zhao Trevino  YOB: 1975          MRN :  [id number]  Date:  11/23/2021  Referring Physician:  Winston Wall  Pt has attended 8 visits in Physical Therapy.    Dx: Strain of right rotator cuff capsul at Dept: [phone].     Sincerely,  Electronically signed by therapist: Trish Hunt PT

## (undated) NOTE — LETTER
AUTHORIZATION FOR SURGICAL OPERATION OR OTHER PROCEDURE    1.  I hereby authorize Dr. Lena Arriaza, and [facility] staff assigned to my case to perform the following operation and/or procedure at the [facility]:    [facility] biopsy_____________________ Patient Name:  ______________________________________________________  (please print)      Patient signature:  ___________________________________________________             Relationship to Patient:           []  Parent    Responsible person